# Patient Record
(demographics unavailable — no encounter records)

---

## 2017-05-14 NOTE — OBHP
===========================

Datetime: 2017 03:49

===========================

   

IP Adm Impression:  Term, intrauterine pregnancy

IP Admit Plan:  Admit to unit; Initiate labor protocol

Admit Comment, IP Provider:  17 y/o  @ 39.1 wks GA HIGINIO 17 by US c/o of gross LOF , large 
gush at 2:40am, with continued spurts. Pt also reports contraction pain every 5-10 minutes increasing
 intensity and frequency 7/10. Pt denies any VB, and reports nromal FM> Pt was recently evalauted and
 examined and ws 4 cm. Pt reports pnc has been uncomplicated.

      

   Ante: Morbid obesity BMI: 43.17, Fibroid, LV echogenic foci, FIbroid 14.8x10.5x10.7cm by 31.4 wk U
S done 3/22/17 , s/p MRI poor reading 17, 

   OB:  2014 FT 6lbs 12 ounces uncomplicated

   GYN: denies hx of ovarian cyst, STI, + FIbroids 

   PMH: Denies

   PSH: denies

   FHX: non contributory

   MEDS: PNV, Iron

   NKDA

      

   A/P 17 y/o  @ 39.1 wks GA, SROM in active labor, with fibroid uterus, teen pregnancy, gbs n
egative

   1. Admit to L+D

   2. NPO, IVF

   3. LR @ !25cc/hour

   4. Pain Management prn/ Anesthesia

   5. Cont toco and efm

   6. Pre OP labs

   7. Incompete prenatal records (will try calling in AM for records)

      

Pelvic Type - PN:  Adequate

Extremities - PN:  Normal

Abdomen - PN:  Normal

Back - PN:  Normal

Breast - PN:  Normal

Lungs - PN:  Normal

Heart - PN:  Normal

Thyroid - PN:  Normal

Neurologic - PN:  Normal

HEENT - PN:  Normal

General - PN:  Normal

Fetal Weight - Estimated:  3500

Fetal Presentation-Admit:  Vertex

FHR - Baseline A Provider:  150

Amniotic Fluid Color, Provider:  Clear

Membranes, Provider:  Ruptured

Gestation - Est Wks by US:  39.1

Pool Provider:  Positive

IP Prenatal Hx Assessment:  The Prenatal History has been Updated

EGA AdmitDate IP:  39.1

Vital Signs Provider:  Reviewed

IP Chief Complaint:  Uterine contractions; Suspected ruptured membranes

NICHD Variability Prov Fetus A:  Moderate 6-25bpm

NICHD Accel Fetus A IP Provider:  15X15

Dilatation, Provider:  5

Effacement, Provider:  60

Station, Provider:  -3

Genitourinary Exam:  Normal

DTRs - PN:  Normal

   

===========================

Datetime: 2017 01:40

===========================

   

IP Chief Complaint Other:  MRI results 

Contraction Comments Provider:  no

Comments, ACOG Physical Exam:  Bedside US: vertex, adequate pocket of  amniotic fluids.

FHR Category Provider Fetus A:  Category I

NICHD Decel Fetus A IP Provider:  None

## 2017-05-14 NOTE — OBDS
===================================

MATERNAL INFORMATION

===================================

   

Provider Comments:  Pt was fully dilated, atruamtic, spontaneous delivery of head. Atraumatic, sponta
neous delivery fo anterior followed by posterior shoulder followed by delivery of the body. Both oral
 and nasal passages of the baby were bulb suctioned. Umbilical cord was clamped and cut. Baby hadned 
to mother on abdomen with RN assistance.  Cord blood collected and sent x 2.  Spontaneous delivery of
 intact placenta with membranes. FUndus Firm.  Seond degree perineal lacetion repaired with 2-0 chorm
ic.  Good hemostaiiss, no complications.

      

   Live female infant delivered

   apgars 9, 9 

   weight of 6lb 4 ounces

   ebl 350ml

   no complications

   

===================================

LABOR SUMMARY

===================================

   

EDC:  05/20/2017 00:00

   

===================================

LABOR INFORMATION

===================================

   

Group B Beta Strep:  Negative

   

===================================

VAGINAL DELIVERY

===================================

   

Laceration Repair Note:  second degree perineal laceration repaired with local anesthesihci lidocaine
 and 2-0 and 3-0 chromic

   

===================================

PRESENTATION/POSITION BABY A

===================================

   

Presentation:  Cephalic

## 2017-05-14 NOTE — OBPN
===========================

Datetime: 2017 05:10

===========================

   

IP Progress Impression:  Normal progression of labor

IP Informed Consent Obtain:  Vaginal Delivery

IP Procedures:  Fetal Scalp Electrode

IP Progress Plan:  Continue present management

Membranes, Provider:  Ruptured

FHR - Baseline A Provider:  150

Gestation - Est Wks by US:  39.1

IP Progress Note Comment:  18 y/u  @ 39.1 wks GA SROM in labor with large fibroid uterus, havi
ng difficulty to maintain continous FHT due to body habitus, pt morbidly obese

   Pt and  consented to fetal scalp electrode to help allow for continous monitoring.

      

   VSS

   VE: 6-7cm, Fetal scalp electrode placed without difficulty

      

   A/P  @ 39.1 wks GA SROM in active labor, Fibroid uterus, teen pregnancy

   - FSE monitoring

   -Anesthesia consult prn

Vital Signs Provider:  Reviewed

NICHD Accel Fetus A IP Provider:  [id number]

FHR Category Provider Fetus A:  Category I

NICHD Variability Prov Fetus A:  Moderate 6-25bpm

Dilatation, Provider:  6-7

Effacement, Provider:  60

Station, Provider:  -3

NICHD Decel Fetus A IP Provider:  Variable

   

===========================

Datetime: 2017 03:49

===========================

   

Pool Provider:  Positive

Amniotic Fluid Color, Provider:  Clear

Fetal Weight - Estimated:  3500

Fetal Presentation-Admit:  Vertex

   

===========================

Datetime: 2017 01:40

===========================

   

Contraction Comments Provider:  no

## 2017-05-14 NOTE — OBADHP
===========================

Datetime: 2017 03:49

===========================

   

Admit Comment, IP Provider:  19 y/o  @ 39.1 wks GA HIGINIO 17 by US c/o of gross LOF , large 
gush at 2:40am, with continued spurts. Pt also reports contraction pain every 5-10 minutes increasing
 intensity and frequency 7/10. Pt denies any VB, and reports nromal FM> Pt was recently evalauted and
 examined and ws 4 cm. Pt reports pnc has been uncomplicated.

      

   Ante: Morbid obesity BMI: 43.17, Fibroid, LV echogenic foci, FIbroid 14.8x10.5x10.7cm by 31.4 wk U
S done 3/22/17 , s/p MRI poor reading 17, 

   OB:  2014 FT 6lbs 12 ounces uncomplicated

   GYN: denies hx of ovarian cyst, STI, + FIbroids 

   PMH: Denies

   PSH: denies

   FHX: non contributory

   MEDS: PNV, Iron

   NKDA

      

   A/P 19 y/o  @ 39.1 wks GA, SROM in active labor, with fibroid uterus, teen pregnancy, gbs n
egative

   1. Admit to L+D

   2. NPO, IVF

   3. LR @ !25cc/hour

   4. Pain Management prn/ Anesthesia

   5. Cont toco and efm

   6. Pre OP labs

   7. Incompete prenatal records (will try calling in AM for records)

      

Pelvic Type - PN:  Adequate

Extremities - PN:  Normal

Abdomen - PN:  Normal

Back - PN:  Normal

Breast - PN:  Normal

Lungs - PN:  Normal

Heart - PN:  Normal

Thyroid - PN:  Normal

Neurologic - PN:  Normal

HEENT - PN:  Normal

General - PN:  Normal

Fetal Weight - Estimated:  3500

Fetal Presentation-Admit:  Vertex

FHR - Baseline A Provider:  150

Amniotic Fluid Color, Provider:  Clear

Membranes, Provider:  Ruptured

Gestation - Est Wks by US:  39.1

Pool Provider:  Positive

IP Prenatal Hx Assessment:  The Prenatal History has been Updated

Vital Signs Provider:  Reviewed

IP Chief Complaint:  Uterine contractions; Suspected ruptured membranes

NICHD Variability Prov Fetus A:  Moderate 6-25bpm

NICHD Accel Fetus A IP Provider:  [id number]

Dilatation, Provider:  5

Effacement, Provider:  60

Station, Provider:  -3

Genitourinary Exam:  Normal

DTRs - PN:  Normal

EGA AdmitDate IP:  39.1

IP Adm Impression:  Term, intrauterine pregnancy

IP Admit Plan:  Admit to unit; Initiate labor protocol

   

===========================

Datetime: 2017 01:40

===========================

   

IP Chief Complaint Other:  MRI results 

Contraction Comments Provider:  no

Comments, ACOG Physical Exam:  Bedside US: vertex, adequate pocket of  amniotic fluids.

FHR Category Provider Fetus A:  Category I

NICHD Decel Fetus A IP Provider:  None

## 2017-05-15 NOTE — CP.PCM.PN
Subjective





- Date & Time of Evaluation


Date of Evaluation: 05/15/17


Time of Evaluation: 09:04





- Subjective


Subjective: 


Vague headache  noted on getting up this morning but improved upon laying and 

sitting on bed. As per Dr. Cooley, He wasn't able to enter the epidural space 

only attempts done. Unlikely to have post dural puncture headache. Currently 

patient sitting in bed not complaining of headache.








Objective





- Vital Signs/Intake and Output


Vital Signs (last 24 hours): 


 











Temp Pulse Resp BP Pulse Ox


 


 97.5 F L  94      141/80 H   


 


 05/14/17 03:30  05/14/17 03:30     05/14/17 03:30   











- Medications


Medications: 


 Current Medications





Benzocaine/Menthol (Dermoplast)  1 sprays TOP PRN PRN


   PRN Reason: Perineal Discomfort


Docusate Sodium (Colace)  100 mg PO BID [facility]


   Last Admin: 05/14/17 16:36 Dose:  100 mg


Ferrous Sulfate (Feosol)  325 mg PO DAILY [facility]


   Last Admin: 05/14/17 15:23 Dose:  325 mg


Ibuprofen (Motrin Tab)  600 mg PO Q6 PRN


   PRN Reason: Pain, Mild (1-3)


   Last Admin: 05/15/17 06:52 Dose:  600 mg


Multivitamins/Minerals (Therapeutic-M Tab)  1 tab PO DAILY [facility]


   Last Admin: 05/14/17 15:24 Dose:  1 tab


Oxycodone/Acetaminophen (Percocet 5/325 Mg Tab)  1 tab PO Q4 PRN


   PRN Reason: Pain, moderate (4-7)


   Stop: 05/17/17 07:00


Oxycodone/Acetaminophen (Percocet 5/325 Mg Tab)  2 tab PO Q4 PRN


   PRN Reason: Pain, severe (8-10)


   Stop: 05/17/17 07:00


Sennosides (Senokot Tab)  17.2 mg PO HS [facility]


   Last Admin: 05/14/17 21:01 Dose:  17.2 mg











- Labs


Labs: 


 





 05/15/17 05:36 





 05/14/17 04:37 











Assessment and Plan





- Assessment and Plan (Free Text)


Assessment: 


Orthostatic headache  probably due to mild dehydration vs hypoglycemia. 





Plan: 


Analgesic for headache. Does not need narcotic analgesic.


Continue hydration.


If conditon worsens, may need further work up.

## 2017-05-16 NOTE — OBDCSUM
===========================

Datetime: 2017 07:03

===========================

   

Discharged to, Provider:  Home

Follow up at, Provider:  [facility]

Disch Instr Activity:  Normal activity; May Shower

Disch Instr Diet:  Regular

Discharge Instructions, Provider:  Routine instructions given

Discharge Diagnosis, Provider:  Term Pregnancy Delivered

Follow up in weeks, Provider:  6 weeks

Disch Activity Restrictions:  No exercising; No lifting; No sexual activity; Nothing in vagina - Inte
rcourse, tampons, douche

Discharge Comment, Provider:  19 y/o   s/p C   @ 39.1  weeks GA

   Delivered baby girl  on 17 @ 6:25 am ,weight 2855 g , APGAR: 9-9 

   Patient doing well, stable for discharge. Prescription given for pain 

      

   -Encourage breastfeeding

   -Tylenol 600 mg PO 1 tab PO Q6h for pain PRN

   -Discharge today 

      

   Ambulate w/ caution, nothing in vagina, no heavy lifting, avoid stairs, if excessive bleeding or f
ever without relief from Tylenol go to ED

   - Advised  for F/U  [facility] in 6 week  and 2-3 days for infant with peditrician.

      

   Paige Rodas PGY1

      

      

   OB Hospitalist on-call....Pt seen and examined by me on rounds this morning.  Agree with PGY1 note
.

   ALPHONSO

   

===========================

Datetime: 2017 07:15

===========================

   

Discharge Instructions, Provider:  Routine instructions given

## 2017-05-16 NOTE — OBPPN
===========================

Datetime: 2017 06:50

===========================

   

PP Pain Prov:  Within normal limits

PP Nausea Prov:  Denies

PP Flatus Prov:  Yes

PP BM Prov:  Yes

PP Breasts Prov:  Normal

PP Heart Prov:  Normal

PP Lungs Prov:  Normal

PP Abdomen/Uterus Prov:  Normal

PP Lochia Prov:  Normal

PP Vulva/Perineum Prov:  Normal

PP CVA Tenderness Prov:  Normal

PP Extremities Prov:  Normal

PP C/S Incision Prov:  Not Applicable

PP Breastfeeding Progress Prov:  Normal

PP Comments Phys Exam Prov:  Uterus: firm below umbilicus

PP Impression Prov:  Normal postpartum progression

PP Plan Prov:  Continue present management

PP Progress Note Prov:  19 y/o  seen and examined at bedside.  Patient   had uneventful overnigh
t.  Patient reports mild pelvic pain controlled w/ pain meds and also headache intermittent 5/10 in i
ntensity when sit up, alleviated with pain meds.  OOB/Ambulating w/o dizziness.  Breast/bottle feedin
g w/o difficulty.  Tolerating PO diet well.  Lochia is less than menses in volume.  Voiding freely w/
 no blood noted.  Reports flatus and had 1  bowel movement yesterday.  Denies fevers, chills, n/v/d, 
CP/SOB, lightheadedness and calf pain. 

      

      

   Assessment: 19 y/o   s/p  on 2017 @ 6:25 am tolerating pain w/ medication, toleratin
g oral intake, adequate urine output, doing well on PPD2.

      

   Plan: 

   Ibuprofen 600 mg 1 tab Q6h PO prn for mild pain.  

   Encourage breast feeding and ambulation. 

   -Discharge patient 

      

   Paige Rodas PGY-1

      

      

   OB Hospitalist on-call....Pt seen and examined by me on rounds this morning.  Agree with PGY1 note
.

   ELVIANDO

Vital Signs Provider PP:  Reviewed; Within Normal Limits

   

===========================

Datetime: 05/15/2017 07:55

===========================

   

PP Impression Other Prov:  headache 8/10 reassess 5/10

PP Plan Other Prov:  reassess

IP PP Procedures:  None

Vital Signs Provider Details PP:  BP : 109/66

## 2017-09-20 NOTE — US
Indication: Rule out ectopic 



Comparison: Ob ultrasound performed 7/30/14 ; pelvic MRI performed 

5/6/17. 



Technique: Transvaginal pelvic ultrasound 



Findings: 



The uterus measures approximately 6.7 x 4.2 x 4.5 cm.  Anteverted.  

Cervix length measures approximately 3.1 cm. 



Endometrial stripe measures approximately 1.1 cm. No evidence of 

intrauterine gestational sac. 



Heterogeneous vascular 15.6 x 10.6 x 16 cm lesion/focus within right 

lower quadrant which appears consistent with soft tissue  ; unclear 

if this is contiguous with the uterus or not. 



Large amount of pelvic free fluid. 



The right ovary measures 2.7 x 2.0 x 2.2 cm. The left ovary is not 

visualized. Blood flow is demonstrated to the right ovary. 



Incidental note is made of right-sided pleural effusion. 



Impression: 



Heterogeneous vascular 15.6 x 10.6 x 16 cm lesion/focus within right 

lower quadrant which appears consistent with soft tissue ; is unclear 

if this is contiguous with the uterus. This finding is indeterminate 

not identified on prior studies.  Neoplasm is not excluded. Correlate 

clinically. Follow-up as indicated. 



No evidence of intrauterine gestational sac. If indeed the patient is 

pregnant based on serum beta HCG values, the sonographic findings 

represent either: Very early IUP; embryonic demise; ectopic 

gestation.  Follow-up with serial quantitative serum beta HCG 

measurements and post OBGYN follow-up as clinically indicated, since 

ectopic gestation cannot be excluded based only on sonographic 

findings. 



Large pelvic free fluid. 



Incidental note is made of right-sided pleural effusion.

## 2017-09-20 NOTE — CP.PCM.CON
<Kadeem Ocampo - Last Filed: 17 19:31>





History of Present Illness





- History of Present Illness


History of Present Illness: 





Samantha Billy is a pleasant 17 yo F  who presented to the ED with L 

lower quadrant abdominal pain. She shares that while laying down this afternoon

, she suddenly felt LLQ abdo pain, constant in duration, exacerbated with 

movement, alleviated with sitting up. Denies: NVD





LMP: 2017 Regular 28-30 day cycles duration of 5 days


obhx:   full term;   full term (without prenatal care) Seen by 

Dr. Juan Carlos bass Hx: denies STIs; 


Mhx: 2017: US: L adnexal myoma measuring 14.8x10.5x10.7


Surg hx: none


Soc: Denies: smoking, alcohol, illicit drugs; currently sexually active; last 

sexual encounter 2 days ago. 


Meds: none





PE:


General: in acute distress  


HEENT: normocephalic, PERRLA; AAOx3  


Heart: no murmurs, regular rate and rhythm, S1, S2 normal.  


Lungs: clear to auscultation bilaterally, no wheezing  


Abdomen: LLQ tenderness to palpation  


CVA: negative  


Lower extremities: negative for pitting edema 


Pelvic exam: negative for vaginal bleed; negative for CMT.





Last bowel movement this AM; no flatus; Last meal was yesterday evening





A:


UPT: + (10)


L lower quadrant tenderness to palpation


TVUS: heterogeneous vascular 15.6 x10.6x 10 cm lesion at RLQ; pleural effusion; 

no evidence of intrauterine gestational sac





P:


Admit to floor; 





1) L lower abdominal pain


-AM abdo/pelvis US


-pain management





2) Fluid in pelvis


-will consider ddx for etiology





3) Myoma





case d/w Dr. Hurst








Past Patient History





- Past Social History


Smoking Status: Never Smoked





- PSYCHIATRIC


Hx Substance Use: No





- SURGICAL HISTORY


Hx Surgeries: No





- ANESTHESIA


Hx Anesthesia: No





Meds


Allergies/Adverse Reactions: 


 Allergies











Allergy/AdvReac Type Severity Reaction Status Date / Time


 


No Known Allergies Allergy   Verified 17 13:58














Results





- Vital Signs


Recent Vital Signs: 


 Last Vital Signs











Temp  98.6 F   17 17:45


 


Pulse  110 H  17 17:45


 


Resp  28 H  17 17:45


 


BP  121/54 L  17 17:45


 


Pulse Ox  98   17 18:52














- Labs


Result Diagrams: 


 17 14:51





 17 14:51


Labs: 


 Laboratory Results - last 24 hr











  17





  14:51 14:51 15:00


 


WBC   9.4  D 


 


RBC   5.26 H 


 


Hgb   11.5 L D 


 


Hct   35.9 


 


MCV   68.3 L D 


 


MCH   21.8 L 


 


MCHC   31.9 L 


 


RDW   17.3 H 


 


Plt Count   532 H D 


 


MPV   8.2 


 


Neut % (Auto)   76.9 H 


 


Lymph % (Auto)   14.5 L 


 


Mono % (Auto)   5.2 


 


Eos % (Auto)   2.6 


 


Baso % (Auto)   0.8 


 


Neut #   7.3 H 


 


Lymph #   1.4 


 


Mono #   0.5 


 


Eos #   0.2 


 


Baso #   0.1 


 


Sodium  142  


 


Potassium  4.2  


 


Chloride  105  


 


Carbon Dioxide  24  


 


Anion Gap  17  


 


BUN  18 H  


 


Creatinine  0.7  


 


Est GFR ( Amer)  > 60  


 


Est GFR (Non-Af Amer)  > 60  


 


Random Glucose  103  


 


Calcium  9.7  


 


Total Bilirubin  0.5  


 


AST  55 H  


 


ALT  63 H D  


 


Alkaline Phosphatase  192 H  


 


Total Protein  8.8 H  


 


Albumin  4.6  


 


Globulin  4.2 H  


 


Albumin/Globulin Ratio  1.1  


 


Beta HCG, Quant   


 


Blood Type    O POSITIVE


 


Antibody Screen    Negative


 


BBK History Checked    Patient has bt














  17





  15:00


 


WBC 


 


RBC 


 


Hgb 


 


Hct 


 


MCV 


 


MCH 


 


MCHC 


 


RDW 


 


Plt Count 


 


MPV 


 


Neut % (Auto) 


 


Lymph % (Auto) 


 


Mono % (Auto) 


 


Eos % (Auto) 


 


Baso % (Auto) 


 


Neut # 


 


Lymph # 


 


Mono # 


 


Eos # 


 


Baso # 


 


Sodium 


 


Potassium 


 


Chloride 


 


Carbon Dioxide 


 


Anion Gap 


 


BUN 


 


Creatinine 


 


Est GFR ( Amer) 


 


Est GFR (Non-Af Amer) 


 


Random Glucose 


 


Calcium 


 


Total Bilirubin 


 


AST 


 


ALT 


 


Alkaline Phosphatase 


 


Total Protein 


 


Albumin 


 


Globulin 


 


Albumin/Globulin Ratio 


 


Beta HCG, Quant  10.75


 


Blood Type 


 


Antibody Screen 


 


BBK History Checked 














<Demario Hurst S - Last Filed: 17 22:05>





History of Present Illness





- History of Present Illness


History of Present Illness: 


OBH Addendum:





Patient seen and examined with Dr. Ocampo. Agree with above assessment and plan 

with the following clarifications and additions.


atient is an 18-year-old female  3 para 2002 with an LMP of 2017.  

she is status post a vaginal delivery in   May 2017. She states that she has 

been having monthly normal menses since her positive her menses after her 

postpartum period.  





Patient presented to ED with complaints of acute onset of left lower quadrant 

pain at around 12:30 PM. She states she has had nothing to eat or drink and did 

have 1 episode of bilious emesis. Patient states that a left sided fibroid was 

detected on her  ultrasound during her prior pregnancy.  she did not present 

for management of her mass following her vaginal delivery.  





Review of Systems





- Constitutional


Constitutional: absent: Chills





- Cardiovascular


Cardiovascular: absent: Chest Pain, Chest Pain at Rest





- Respiratory


Respiratory: absent: Cough, Dyspnea on Exertion





- Gastrointestinal


Gastrointestinal: Abdominal Pain.  absent: Bloating, Change in Bowel Habits, 

Coffee Ground Emesis, Constipation





- Genitourinary


Genitourinary: absent: Difficulty Urinating





- Reproductive: Female


Reproductive:Female: Normal Menses.  absent: Cycle <21 Days, Cycle >35 Days, 

Menses >/= 8 Days, Menses Variable





- Menstruation


Menstruation: absent: Abnormal Vaginal Bleeding





Past Patient History





- Past Medical History & Family History


Past Medical History?: No





- Past Social History


Alcohol: None





- CARDIAC


Hx Cardiac Disorders: No





- PULMONARY


Hx Respiratory Disorders: No





- NEUROLOGICAL


Hx Neurological Disorder: No





- HEENT


Hx HEENT Problems: No





- ENDOCRINE/METABOLIC


Hx Endocrine Disorders: No





- GASTROINTESTINAL


Hx Gastrointestinal Disorders: No





Meds





- Medications


Medications: 


 Current Medications





Acetaminophen (Tylenol 325mg Tab)  650 mg PO Q6 PRN


   PRN Reason: Pain, Mild (1-3)


Ferrous Sulfate (Feosol)  325 mg PO BID [facility]


Prenatal Multivit/Folic Acid/Iron (Prenatal)  1 tab PO DAILY [facility]











Physical Exam





- Head Exam


Head Exam: ATRAUMATIC, NORMOCEPHALIC





- Respiratory Exam


Respiratory Exam: NORMAL BREATHING PATTERN





- Cardiovascular Exam


Cardiovascular Exam: REGULAR RHYTHM





- GI/Abdominal Exam


GI & Abdominal Exam: Normal Bowel Sounds, Tenderness (LLQtenderness to palpation

).  absent: Distended





- Rectal Exam


Rectal Exam: NORMAL INSPECTION





-  Exam


External exam: NORMAL EXTERNAL EXAM


Speculum exam: NORMAL SPECULUM EXAM


Bimanual exam: absent: Cervical Motion Tendernes, Uterine Tenderness





- Extremities Exam


Extremities exam: Positive for: normal inspection





- Neurological Exam


Neurological exam: Oriented x3





Results





- Vital Signs


Recent Vital Signs: 


 Last Vital Signs











Temp  98.6 F   17 17:45


 


Pulse  110 H  17 17:45


 


Resp  28 H  17 17:45


 


BP  121/54 L  17 17:45


 


Pulse Ox  98   17 18:52














- Labs


Result Diagrams: 


 17 14:51





 17 14:51


Labs: 


 Laboratory Results - last 24 hr











  17





  14:51 14:51 15:00


 


WBC   9.4  D 


 


RBC   5.26 H 


 


Hgb   11.5 L D 


 


Hct   35.9 


 


MCV   68.3 L D 


 


MCH   21.8 L 


 


MCHC   31.9 L 


 


RDW   17.3 H 


 


Plt Count   532 H D 


 


MPV   8.2 


 


Neut % (Auto)   76.9 H 


 


Lymph % (Auto)   14.5 L 


 


Mono % (Auto)   5.2 


 


Eos % (Auto)   2.6 


 


Baso % (Auto)   0.8 


 


Neut #   7.3 H 


 


Lymph #   1.4 


 


Mono #   0.5 


 


Eos #   0.2 


 


Baso #   0.1 


 


Sodium  142  


 


Potassium  4.2  


 


Chloride  105  


 


Carbon Dioxide  24  


 


Anion Gap  17  


 


BUN  18 H  


 


Creatinine  0.7  


 


Est GFR ( Amer)  > 60  


 


Est GFR (Non-Af Amer)  > 60  


 


Random Glucose  103  


 


Calcium  9.7  


 


Total Bilirubin  0.5  


 


AST  55 H  


 


ALT  63 H D  


 


Alkaline Phosphatase  192 H  


 


Total Protein  8.8 H  


 


Albumin  4.6  


 


Globulin  4.2 H  


 


Albumin/Globulin Ratio  1.1  


 


Beta HCG, Quant   


 


Blood Type    O POSITIVE


 


Antibody Screen    Negative


 


BBK History Checked    Patient has bt














  17





  15:00


 


WBC 


 


RBC 


 


Hgb 


 


Hct 


 


MCV 


 


MCH 


 


MCHC 


 


RDW 


 


Plt Count 


 


MPV 


 


Neut % (Auto) 


 


Lymph % (Auto) 


 


Mono % (Auto) 


 


Eos % (Auto) 


 


Baso % (Auto) 


 


Neut # 


 


Lymph # 


 


Mono # 


 


Eos # 


 


Baso # 


 


Sodium 


 


Potassium 


 


Chloride 


 


Carbon Dioxide 


 


Anion Gap 


 


BUN 


 


Creatinine 


 


Est GFR ( Amer) 


 


Est GFR (Non-Af Amer) 


 


Random Glucose 


 


Calcium 


 


Total Bilirubin 


 


AST 


 


ALT 


 


Alkaline Phosphatase 


 


Total Protein 


 


Albumin 


 


Globulin 


 


Albumin/Globulin Ratio 


 


Beta HCG, Quant  10.75


 


Blood Type 


 


Antibody Screen 


 


BBK History Checked 














Assessment & Plan





- Assessment and Plan (Free Text)


Assessment: 





Impression:


Left lower quadrant pain


Right sided pelvic mass by US today


Left sided adnexal mass by US on 3/22/2017 during her preg


Large amount of hypoechoic free fluid in pelvis


Positive pregnancy test with Q hCG= 11


Right Sided Pleural effusion





Plan:


US findings d/w Dr. Joshi, radiologist. She recommends repeat pelvic us with 

abdominal us tomorrow.


Repeat cbc tomorrow am.


Needs f/u QHCG.

## 2017-09-20 NOTE — CP.PCM.HP
History of Present Illness





- History of Present Illness


History of Present Illness: 





18-year-old female  with an LMP of 2017; status post a vaginal 

delivery in May 2017 presented to ED with complaints of acute onset of left 

lower quadrant pain at around 12:30 PM. She states she has had nothing to eat 

or drink and did have 1 episode of bilious emesis. Patient states that a left 

sided fibroid was detected on her  ultrasound during her prior 2nd pregnancy.  

she did not present for management of her mass following her vaginal delivery. 

Denies any associated vaginal bleeding, fever, chills, chest pain or shortness 

of breath.





PMD: None





obhx:   full term; 2017  full term (without prenatal care) Seen by 

Dr. Rangel


gyn Hx: denies STIs; 


PMhx: 2017: US: L adnexal myoma measuring 14.8x10.5x10.7


Surg hx: none


Soc: Denies: smoking, alcohol, illicit drugs; currently sexually active; last 

sexual encounter 2 days a





 








Present on Admission





- Present on Admission


Any Indicators Present on Admission: No





Review of Systems





- Review of Systems


All systems: reviewed and no additional remarkable complaints except


Review of Systems: 





per HPI





Past Patient History





- Past Social History


Smoking Status: Never Smoked





- PSYCHIATRIC


Hx Substance Use: No





- SURGICAL HISTORY


Hx Surgeries: No





- ANESTHESIA


Hx Anesthesia: No





Meds


Allergies/Adverse Reactions: 


 Allergies











Allergy/AdvReac Type Severity Reaction Status Date / Time


 


No Known Allergies Allergy   Verified 17 13:58














Physical Exam





- Constitutional


Appears: Non-toxic, No Acute Distress





- Head Exam


Head Exam: NORMOCEPHALIC





- Eye Exam


Eye Exam: Normal appearance, PERRL


Pupil Exam: NORMAL ACCOMODATION





- ENT Exam


ENT Exam: Mucous Membranes Moist





- Respiratory Exam


Respiratory Exam: Clear to Auscultation Bilateral, NORMAL BREATHING PATTERN.  

absent: Rhonchi, Wheezes





- Cardiovascular Exam


Cardiovascular Exam: REGULAR RHYTHM, +S1, +S2





- GI/Abdominal Exam


GI & Abdominal Exam: Normal Bowel Sounds, Soft, Tenderness


Additional comments: 





LLQ tenderness , +rebound tenderness





- Extremities Exam


Extremities exam: Negative for: calf tenderness, pedal edema, tenderness





- Back Exam


Back exam: absent: CVA tenderness (L), CVA tenderness (R)





- Neurological Exam


Neurological exam: Alert, CN II-XII Intact, Oriented x3, Reflexes Normal





Results





- Vital Signs


Recent Vital Signs: 





 Last Vital Signs











Temp  98.6 F   17 17:45


 


Pulse  110 H  17 17:45


 


Resp  28 H  17 17:45


 


BP  121/54 L  17 17:45


 


Pulse Ox  98   17 18:52














- Labs


Result Diagrams: 


 17 14:51





 17 14:51


Labs: 





 Laboratory Results - last 24 hr











  17





  14:51 14:51 15:00


 


WBC   9.4  D 


 


RBC   5.26 H 


 


Hgb   11.5 L D 


 


Hct   35.9 


 


MCV   68.3 L D 


 


MCH   21.8 L 


 


MCHC   31.9 L 


 


RDW   17.3 H 


 


Plt Count   532 H D 


 


MPV   8.2 


 


Neut % (Auto)   76.9 H 


 


Lymph % (Auto)   14.5 L 


 


Mono % (Auto)   5.2 


 


Eos % (Auto)   2.6 


 


Baso % (Auto)   0.8 


 


Neut #   7.3 H 


 


Lymph #   1.4 


 


Mono #   0.5 


 


Eos #   0.2 


 


Baso #   0.1 


 


Sodium  142  


 


Potassium  4.2  


 


Chloride  105  


 


Carbon Dioxide  24  


 


Anion Gap  17  


 


BUN  18 H  


 


Creatinine  0.7  


 


Est GFR ( Amer)  > 60  


 


Est GFR (Non-Af Amer)  > 60  


 


Random Glucose  103  


 


Calcium  9.7  


 


Total Bilirubin  0.5  


 


AST  55 H  


 


ALT  63 H D  


 


Alkaline Phosphatase  192 H  


 


Total Protein  8.8 H  


 


Albumin  4.6  


 


Globulin  4.2 H  


 


Albumin/Globulin Ratio  1.1  


 


Beta HCG, Quant   


 


Blood Type    O POSITIVE


 


Antibody Screen    Negative


 


BBK History Checked    Patient has bt














  17





  15:00


 


WBC 


 


RBC 


 


Hgb 


 


Hct 


 


MCV 


 


MCH 


 


MCHC 


 


RDW 


 


Plt Count 


 


MPV 


 


Neut % (Auto) 


 


Lymph % (Auto) 


 


Mono % (Auto) 


 


Eos % (Auto) 


 


Baso % (Auto) 


 


Neut # 


 


Lymph # 


 


Mono # 


 


Eos # 


 


Baso # 


 


Sodium 


 


Potassium 


 


Chloride 


 


Carbon Dioxide 


 


Anion Gap 


 


BUN 


 


Creatinine 


 


Est GFR ( Amer) 


 


Est GFR (Non-Af Amer) 


 


Random Glucose 


 


Calcium 


 


Total Bilirubin 


 


AST 


 


ALT 


 


Alkaline Phosphatase 


 


Total Protein 


 


Albumin 


 


Globulin 


 


Albumin/Globulin Ratio 


 


Beta HCG, Quant  10.75


 


Blood Type 


 


Antibody Screen 


 


BBK History Checked 














Assessment & Plan





- Assessment and Plan (Free Text)


Assessment: 





17 y/o  with no significant medical history being admitted for LLQ pain 

with ultrasound finding of effusion cannot rule out ectopic pregnancy or normal 

IUP 


Plan: 





1. Left Lower quadrant abdominal pain


TVUS: heterogeneous vascular 15.6 x10.6x 10 cm lesion at RLQ; pleural effusion; 

no evidence of intrauterine gestational sac


cannot rule out ectopic pregnancy or IUP


pain management as ordered


Beta HCG ordered for 12PM 17


Repeat Ultrasound in the am


monitor





2. Myoma 


will need outpatient Gyn referral for further management





3. Diet- Regular





4. DVT prophylaxis- SCds

## 2017-09-20 NOTE — ED PDOC
HPI: Abdomen


Time Seen by Provider: 17 14:17


Chief Complaint (Nursing): Abdominal Pain


History Per: Patient


Onset/Duration Of Symptoms: Hrs (3)


Current Symptoms Are (Timing): Still Present


Severity: Mild


Pain Scale Rating Of: 2


Location Of Pain/Discomfort: LLQ


Quality Of Discomfort: Sharp


Associated Symptoms: denies: Fever, Nausea, Vomiting, Diarrhea, Urinary Symptoms


Exacerbating Factors: None


Alleviating Factors: None


Additional Complaint(s): 





Sharp LLQ abd pain x 3 hrs PTA ED. Denies NVD. No urinary sxs. No vaginal 

bleeding or discharge. LMP 1 week ago. H/o fibroids


Abnormal Vaginal Bleeding: Yes





Past Medical History


Vital Signs: 


 Last Vital Signs











Temp  98.6 F   17 17:45


 


Pulse  110 H  17 17:45


 


Resp  28 H  17 17:45


 


BP  121/54 L  17 17:45


 


Pulse Ox  96   17 17:45














- Medical History


Other PMH: Fibroids





- Family History


Family History: States: Unknown Family Hx





- Home Medications


Home Medications: 


 Ambulatory Orders











 Medication  Instructions  Recorded


 


Prenatal Vit Calc,Iron,Folic 1 each PO DAILY 17





[Prenatal Vitamins]  


 


Ferrous Sulfate [Feosol] 325 mg PO BID #60 tab 17


 


Ibuprofen [Motrin Tab] 600 mg PO Q6 PRN #30 tab 17














- Allergies


Allergies/Adverse Reactions: 


 Allergies











Allergy/AdvReac Type Severity Reaction Status Date / Time


 


No Known Allergies Allergy   Verified 17 13:58














Review of Systems


ROS Statement: Except As Marked, All Systems Reviewed And Found Negative


Gastrointestinal: Positive for: Abdominal Pain.  Negative for: Nausea, Vomiting

, Diarrhea


Genitourinary Female: Negative for: Dysuria, Frequency, Vaginal Discharge, 

Vaginal Bleeding


Musculoskeletal: Negative for: Back Pain





Physical Exam





- Physical Exam


Appears: Positive for: Non-toxic, No Acute Distress


Skin: Positive for: Normal Color, Warm, DRY


Gastrointestinal/Abdominal: Positive for: Bowel Sounds, Soft, Tenderness (LLQ).

  Negative for: Mass


Back: Negative for: L CVA Tenderness, R CVA Tenderness


Extremity: Positive for: Normal ROM


Neurologic/Psych: Positive for: Oriented





- Laboratory Results


Result Diagrams: 


 17 14:51





 17 14:51





- ECG


O2 Sat by Pulse Oximetry: 98





Disposition





- Clinical Impression


Clinical Impression: 


 Threatened , Pleural effusion








- Patient ED Disposition


Is Patient to be Admitted: Yes





- Disposition


Disposition Time: 17:27


Condition: FAIR


Additional Instructions: 


Return for repeat Beta HCG 48 hrs.


Instructions:  Threatened Miscarriage (ED)


Forms:  [facility] (English)





- Pt Status Changed To:


Hospital Disposition Of: Observation





- POA


Present On Arrival: None

## 2017-09-21 NOTE — CP.PCM.PN
Subjective





- Date & Time of Evaluation


Date of Evaluation: 17


Time of Evaluation: 07:40





- Subjective


Subjective: 





17 yo female seen at the bedside and examined. Pt reports continuous pain on 

her lower abdomen (L>R),worse with body movement. Pt reports saw spotting this 

morning when wiping with tissue. Denies clots of blood or heavy bleeding. 

Denies nausea, vomiting, fever, chills. Denies chest pain, dizziness, headache 

or leg pain. Pt is scheduled to go OR today for laparotomy, removal of ectopic 

pregancy if present, and removal of pelvic mass.





Objective





- Vital Signs/Intake and Output


Vital Signs (last 24 hours): 


 











Temp Pulse Resp BP Pulse Ox


 


 98.2 F   121 H  20   120/80   95 


 


 17 08:00  17 08:00  17 08:00  17 08:00  17 08:00











- Medications


Medications: 


 Current Medications





Acetaminophen (Tylenol 325mg Tab)  650 mg PO Q6 PRN


   PRN Reason: Pain, Mild (1-3)


   Last Admin: 17 06:53 Dose:  650 mg


Ferrous Sulfate (Feosol)  325 mg PO BID Counts include 234 beds at the [facility]


   Last Admin: 17 08:55 Dose:  Not Given


Prenatal Multivit/Folic Acid/Iron (Prenatal)  1 tab PO DAILY Counts include 234 beds at the [facility]


   Last Admin: 17 08:56 Dose:  Not Given











- Labs


Labs: 


 





 17 14:51 





 17 14:51 











- Constitutional


Appears: In Acute Distress (from pain), Other (Obese)





- Head Exam


Head Exam: ATRAUMATIC, NORMAL INSPECTION





- Eye Exam


Eye Exam: Normal appearance





- ENT Exam


ENT Exam: Mucous Membranes Moist, Normal Exam





- Neck Exam


Neck Exam: Normal Inspection





- Respiratory Exam


Respiratory Exam: Clear to Ausculation Bilateral, NORMAL BREATHING PATTERN.  

absent: Rhonchi, Wheezes





- Cardiovascular Exam


Cardiovascular Exam: REGULAR RHYTHM, +S1, +S2


Additional comments: 





slight tachycardia





- GI/Abdominal Exam


GI & Abdominal Exam: Soft, Normal Bowel Sounds


Additional comments: 





Diffuse tenderness in lower abdomen( L>R), no guarding or rebound tenderness.





- Extremities Exam


Extremities Exam: Normal Capillary Refill.  absent: Pedal Edema





- Neurological Exam


Neurological Exam: Alert, Awake, CN II-XII Intact, Oriented x3





Assessment and Plan





- Assessment and Plan (Free Text)


Assessment: 


Assessment and Plan: 





17 y/o  with no significant medical history being admitted for lower 

abdominal pain with ultrasound finding of effusion cannot rule out ectopic 

pregnancy or normal IUP 








1. Acute Lower abdominal pain





OR for aparotomy, removal of ectopic pregnancy if present, removal of pelvic 

mass.


TVUS: heterogeneous vascular 15.6 x10.6x 10 cm lesion at RLQ; pleural effusion; 

no evidence of intrauterine gestational sac


pain management as ordered


Beta HCG Quant: 10.75


monitor





2. Elevated liver enzymes


-f/u with PCP outpatient





3. Diet


  - NPO





4. DVT prophylaxis 


   -SCds

## 2017-09-21 NOTE — CP.PCM.CON
History of Present Illness





- History of Present Illness


History of Present Illness: 





Pt seen and examined this morning at bedside, no acute overnight events, 

reports mild pain on LLQ well controlled with medications, the pain is 

aggravated with movement and better in rest. States slept well during night. 

Denies F/N/V, no vaginal bleeding, no urinary symptoms, no headache, sob or cp.





Review of Systems





- Review of Systems


Review of Systems: 





As per HPI





Past Patient History





- Past Medical History & Family History


Past Medical History?: No





- Past Social History


Smoking Status: Never Smoked





- CARDIAC


Hx Cardiac Disorders: No





- PULMONARY


Hx Respiratory Disorders: No





- NEUROLOGICAL


Hx Neurological Disorder: No





- HEENT


Hx HEENT Problems: No





- RENAL


Hx Chronic Kidney Disease: No





- ENDOCRINE/METABOLIC


Hx Endocrine Disorders: No





- HEMATOLOGICAL/ONCOLOGICAL


Hx Blood Disorders: No


Hx AIDS: No


Hx Human Immunodeficiency Virus (HIV): No





- INTEGUMENTARY


Hx Dermatological Problems: No





- MUSCULOSKELETAL/RHEUMATOLOGICAL


Hx Musculoskeletal Disorders: No


Hx Falls: No





- GASTROINTESTINAL


Hx Gastrointestinal Disorders: No





- GENITOURINARY/GYNECOLOGICAL


Hx Reproductive Disorders: Yes (Uterine fibroids)





- PSYCHIATRIC


Hx Psychophysiologic Disorder: No


Hx Substance Use: No





- SURGICAL HISTORY


Hx Surgeries: No





- ANESTHESIA


Hx Anesthesia: No


Hx Anesthesia Reactions: No


Hx Malignant Hyperthermia: No


Has any member of the family had a problem w/ anesthesia?: No





Meds


Allergies/Adverse Reactions: 


 Allergies











Allergy/AdvReac Type Severity Reaction Status Date / Time


 


No Known Allergies Allergy   Verified 09/20/17 13:58














- Medications


Medications: 


 Current Medications





Acetaminophen (Tylenol 325mg Tab)  650 mg PO Q6 PRN


   PRN Reason: Pain, Mild (1-3)


Ferrous Sulfate (Feosol)  325 mg PO BID [facility]


Influenza Virus Vaccine (Afluria (Pf)(18yr & Older))  0.5 ml IM .ONCE ONE


   Stop: 09/21/17 06:01


Prenatal Multivit/Folic Acid/Iron (Prenatal)  1 tab PO DAILY [facility]











Physical Exam





- Constitutional


Appears: Well, No Acute Distress





- Head Exam


Head Exam: ATRAUMATIC, NORMOCEPHALIC





- Respiratory Exam


Respiratory Exam: Clear to Auscultation Bilateral.  absent: Rales, Rhonchi, 

Wheezes





- Cardiovascular Exam


Cardiovascular Exam: REGULAR RHYTHM, +S1, +S2





- GI/Abdominal Exam


GI & Abdominal Exam: Normal Bowel Sounds, Soft, Tenderness (In LLQ during 

palpation.).  absent: Distended





- Neurological Exam


Neurological exam: Alert, CN II-XII Intact, Oriented x3





Results





- Vital Signs


Recent Vital Signs: 


 Last Vital Signs











Temp  98.7 F   09/21/17 00:29


 


Pulse  110 H  09/21/17 02:21


 


Resp  18   09/21/17 02:21


 


BP  110/75   09/21/17 00:29


 


Pulse Ox  96   09/21/17 02:21














- Labs


Result Diagrams: 


 09/20/17 14:51





 09/20/17 14:51


Labs: 


 Laboratory Results - last 24 hr











  09/20/17 09/20/17 09/20/17





  14:51 14:51 15:00


 


WBC   9.4  D 


 


RBC   5.26 H 


 


Hgb   11.5 L D 


 


Hct   35.9 


 


MCV   68.3 L D 


 


MCH   21.8 L 


 


MCHC   31.9 L 


 


RDW   17.3 H 


 


Plt Count   532 H D 


 


MPV   8.2 


 


Neut % (Auto)   76.9 H 


 


Lymph % (Auto)   14.5 L 


 


Mono % (Auto)   5.2 


 


Eos % (Auto)   2.6 


 


Baso % (Auto)   0.8 


 


Neut #   7.3 H 


 


Lymph #   1.4 


 


Mono #   0.5 


 


Eos #   0.2 


 


Baso #   0.1 


 


Sodium  142  


 


Potassium  4.2  


 


Chloride  105  


 


Carbon Dioxide  24  


 


Anion Gap  17  


 


BUN  18 H  


 


Creatinine  0.7  


 


Est GFR ( Amer)  > 60  


 


Est GFR (Non-Af Amer)  > 60  


 


Random Glucose  103  


 


Calcium  9.7  


 


Total Bilirubin  0.5  


 


AST  55 H  


 


ALT  63 H D  


 


Alkaline Phosphatase  192 H  


 


Total Protein  8.8 H  


 


Albumin  4.6  


 


Globulin  4.2 H  


 


Albumin/Globulin Ratio  1.1  


 


Beta HCG, Quant   


 


Blood Type    O POSITIVE


 


Antibody Screen    Negative


 


BBK History Checked    Patient has bt














  09/20/17





  15:00


 


WBC 


 


RBC 


 


Hgb 


 


Hct 


 


MCV 


 


MCH 


 


MCHC 


 


RDW 


 


Plt Count 


 


MPV 


 


Neut % (Auto) 


 


Lymph % (Auto) 


 


Mono % (Auto) 


 


Eos % (Auto) 


 


Baso % (Auto) 


 


Neut # 


 


Lymph # 


 


Mono # 


 


Eos # 


 


Baso # 


 


Sodium 


 


Potassium 


 


Chloride 


 


Carbon Dioxide 


 


Anion Gap 


 


BUN 


 


Creatinine 


 


Est GFR ( Amer) 


 


Est GFR (Non-Af Amer) 


 


Random Glucose 


 


Calcium 


 


Total Bilirubin 


 


AST 


 


ALT 


 


Alkaline Phosphatase 


 


Total Protein 


 


Albumin 


 


Globulin 


 


Albumin/Globulin Ratio 


 


Beta HCG, Quant  10.75


 


Blood Type 


 


Antibody Screen 


 


BBK History Checked 














Assessment & Plan





- Assessment and Plan (Free Text)


Assessment: 











1)Left lower quadrant pain


Left sided adnexal mass by US on 3/22/2017 during her preg


Large amount of hypoechoic free fluid in pelvis


Positive pregnancy test with Q hCG= 11


Right Sided Pleural effusion


Right sided pelvic mass by US





Plan:


Pelvic and TVUS  with abdominal us today


F/U cbc


Needs f/u QHCG. 





Case d/w Dr Hurst.


Henri Miner PGY 1

## 2017-09-21 NOTE — CP.PCM.CON
History of Present Illness





- History of Present Illness


History of Present Illness: 





Patient seen and examined. Patient reports continued pain slightly worsened. I 

discussed with patient ultrasound findings.


Ultrasound reports large amount of pelvic free fluid as well as 15 cm pelvic 

mass. Patient had previously diagnosed uterine fibroid during pregnancy in May 

2017. I discussed with patient that this free fluid may be from the 

hemoperitoneum caused by an ectopic pregnancy, but this is not completely clear 

due to the fact that her hCG level is so low. Regardless, due to ultrasound 

findings as well as her worsening symptoms, patient needs surgical intervention.


Patient consented for laparotomy, removal of ectopic pregnancy if present, 

removal of pelvic mass. I discussed with patient all the risks, benefits, 

alternatives of surgery. All patient questions answered.





Past Patient History





- Past Medical History & Family History


Past Medical History?: No





- Past Social History


Smoking Status: Never Smoked





- CARDIAC


Hx Cardiac Disorders: No





- PULMONARY


Hx Respiratory Disorders: No





- NEUROLOGICAL


Hx Neurological Disorder: No





- HEENT


Hx HEENT Problems: No





- RENAL


Hx Chronic Kidney Disease: No





- ENDOCRINE/METABOLIC


Hx Endocrine Disorders: No





- HEMATOLOGICAL/ONCOLOGICAL


Hx Blood Disorders: No


Hx AIDS: No


Hx Human Immunodeficiency Virus (HIV): No





- INTEGUMENTARY


Hx Dermatological Problems: No





- MUSCULOSKELETAL/RHEUMATOLOGICAL


Hx Musculoskeletal Disorders: No


Hx Falls: No





- GASTROINTESTINAL


Hx Gastrointestinal Disorders: No





- GENITOURINARY/GYNECOLOGICAL


Hx Reproductive Disorders: Yes (Uterine fibroids)





- PSYCHIATRIC


Hx Psychophysiologic Disorder: No


Hx Substance Use: No





- SURGICAL HISTORY


Hx Surgeries: No





- ANESTHESIA


Hx Anesthesia: No


Hx Anesthesia Reactions: No


Hx Malignant Hyperthermia: No


Has any member of the family had a problem w/ anesthesia?: No





Meds


Allergies/Adverse Reactions: 


 Allergies











Allergy/AdvReac Type Severity Reaction Status Date / Time


 


No Known Allergies Allergy   Verified 09/20/17 13:58














- Medications


Medications: 


 Current Medications





Acetaminophen (Tylenol 325mg Tab)  650 mg PO Q6 PRN


   PRN Reason: Pain, Mild (1-3)


   Last Admin: 09/21/17 06:53 Dose:  650 mg


Ferrous Sulfate (Feosol)  325 mg PO BID [facility]


   Last Admin: 09/21/17 08:55 Dose:  Not Given


Prenatal Multivit/Folic Acid/Iron (Prenatal)  1 tab PO DAILY [facility]


   Last Admin: 09/21/17 08:56 Dose:  Not Given











Physical Exam





- Eye Exam


Eye Exam: Normal appearance





- ENT Exam


ENT Exam: Mucous Membranes Moist





- Respiratory Exam


Respiratory Exam: Clear to Auscultation Bilateral, NORMAL BREATHING PATTERN





- Cardiovascular Exam


Cardiovascular Exam: REGULAR RHYTHM





- GI/Abdominal Exam


Additional comments: 





Positive diffuse lower abdominal tenderness, positive guarding, positive 

rebound. Nondistended. Normal bowel sounds.





Results





- Vital Signs


Recent Vital Signs: 


 Last Vital Signs











Temp  98.2 F   09/21/17 08:00


 


Pulse  121 H  09/21/17 08:00


 


Resp  20   09/21/17 08:00


 


BP  120/80   09/21/17 08:00


 


Pulse Ox  95   09/21/17 08:00














- Labs


Result Diagrams: 


 09/20/17 14:51





 09/20/17 14:51


Labs: 


 Laboratory Results - last 24 hr











  09/20/17 09/20/17 09/20/17





  14:51 14:51 15:00


 


WBC   9.4  D 


 


RBC   5.26 H 


 


Hgb   11.5 L D 


 


Hct   35.9 


 


MCV   68.3 L D 


 


MCH   21.8 L 


 


MCHC   31.9 L 


 


RDW   17.3 H 


 


Plt Count   532 H D 


 


MPV   8.2 


 


Neut % (Auto)   76.9 H 


 


Lymph % (Auto)   14.5 L 


 


Mono % (Auto)   5.2 


 


Eos % (Auto)   2.6 


 


Baso % (Auto)   0.8 


 


Neut #   7.3 H 


 


Lymph #   1.4 


 


Mono #   0.5 


 


Eos #   0.2 


 


Baso #   0.1 


 


Sodium  142  


 


Potassium  4.2  


 


Chloride  105  


 


Carbon Dioxide  24  


 


Anion Gap  17  


 


BUN  18 H  


 


Creatinine  0.7  


 


Est GFR ( Amer)  > 60  


 


Est GFR (Non-Af Amer)  > 60  


 


Random Glucose  103  


 


Calcium  9.7  


 


Total Bilirubin  0.5  


 


AST  55 H  


 


ALT  63 H D  


 


Alkaline Phosphatase  192 H  


 


Total Protein  8.8 H  


 


Albumin  4.6  


 


Globulin  4.2 H  


 


Albumin/Globulin Ratio  1.1  


 


Beta HCG, Quant   


 


Blood Type    O POSITIVE


 


Antibody Screen    Negative


 


BBK History Checked    Patient has bt














  09/20/17





  15:00


 


WBC 


 


RBC 


 


Hgb 


 


Hct 


 


MCV 


 


MCH 


 


MCHC 


 


RDW 


 


Plt Count 


 


MPV 


 


Neut % (Auto) 


 


Lymph % (Auto) 


 


Mono % (Auto) 


 


Eos % (Auto) 


 


Baso % (Auto) 


 


Neut # 


 


Lymph # 


 


Mono # 


 


Eos # 


 


Baso # 


 


Sodium 


 


Potassium 


 


Chloride 


 


Carbon Dioxide 


 


Anion Gap 


 


BUN 


 


Creatinine 


 


Est GFR ( Amer) 


 


Est GFR (Non-Af Amer) 


 


Random Glucose 


 


Calcium 


 


Total Bilirubin 


 


AST 


 


ALT 


 


Alkaline Phosphatase 


 


Total Protein 


 


Albumin 


 


Globulin 


 


Albumin/Globulin Ratio 


 


Beta HCG, Quant  10.75


 


Blood Type 


 


Antibody Screen 


 


BBK History Checked 














- Imaging and Cardiology


  ** US - abdomen


Status: Image reviewed by me, Report reviewed by me





Assessment & Plan





- Assessment and Plan (Free Text)


Assessment: 





Acute abdominal pain, possible ectopic pregnancy, pelvic mass possible uterine 

fibroid.


Plan: 





As above.


Routine postop observation and care.





- Date & Time


Date: 09/21/17


Time: 10:18

## 2017-09-21 NOTE — CARD
--------------- APPROVED REPORT --------------





EKG Measurement

Heart [id number]

LA [id number]

[id number]

[id number]

[id number]



<Conclusion>

Sinus tachycardia

Otherwise normal ECG

## 2017-09-22 NOTE — CP.PCM.PN
Subjective





- Date & Time of Evaluation


Date of Evaluation: 09/22/17


Time of Evaluation: 05:32





- Subjective


Subjective: 


Patient seen and examined at bedside this morning, s/p laparotomy and L 

salpingo oopherectomy, no acute overnight events, reports mild pain in incision 

zone but well controlled with pain medications, reports mild vaginal spotting, 

tolerating well liquid diet, denies fever, nausea, vomiting, no sob, cp, 

headache,no urinary symptoms.





Objective





- Vital Signs/Intake and Output


Vital Signs (last 24 hours): 


 











Temp Pulse Resp BP Pulse Ox


 


 98.6 F   99   18   105/68 L  99 


 


 09/22/17 04:46  09/22/17 04:46  09/22/17 04:46  09/22/17 04:46  09/22/17 04:46








Intake and Output: 


 











 09/21/17 09/22/17





 18:59 06:59


 


Intake Total 1680 975


 


Output Total 60 850


 


Balance 1620 125














- Medications


Medications: 


 Current Medications





Acetaminophen (Tylenol 325mg Tab)  650 mg PO Q6 PRN


   PRN Reason: Pain, Mild (1-3)


   Last Admin: 09/21/17 06:53 Dose:  650 mg


Ferrous Sulfate (Feosol)  325 mg PO BID [facility]


   Last Admin: 09/21/17 17:49 Dose:  325 mg


Hydromorphone HCl (Dilaudid 0.2 Mg/Ml Pca)  6 mg IV Q4 [facility]


   PRN Reason: Protocol


   Last Admin: 09/22/17 02:10 Dose:  6 mg


Sodium Chloride (Sodium Chloride 0.9%)  1,000 mls @ 100 mls/hr IV .Q10H [facility]


   Last Admin: 09/21/17 23:51 Dose:  100 mls/hr


Prenatal Multivit/Folic Acid/Iron (Prenatal)  1 tab PO DAILY [facility]


   Last Admin: 09/21/17 08:56 Dose:  Not Given











- Labs


Labs: 


 





 09/20/17 14:51 





 09/20/17 14:51 











- Constitutional


Appears: No Acute Distress





- Head Exam


Head Exam: ATRAUMATIC, NORMOCEPHALIC





- Respiratory Exam


Respiratory Exam: Clear to Ausculation Bilateral





- Cardiovascular Exam


Cardiovascular Exam: REGULAR RHYTHM, +S1, +S2.  absent: Murmur





- GI/Abdominal Exam


GI & Abdominal Exam: Soft, Tenderness (mild tenderness in incision zone on 

palpation. Dressing clean.), Normal Bowel Sounds





- Extremities Exam


Extremities Exam: absent: Calf Tenderness





Assessment and Plan





- Assessment and Plan (Free Text)


Assessment: 





19 yo F patient s/p Laparotomy and L salpingo oopherectomy doing well on POD 1





Routine postop observation and care.


Advance to regular diet today


Pain management

## 2017-09-22 NOTE — PCM.SURG1
Surgeon's Initial Post Op Note





- Surgeon's Notes


Surgeon: Roro


Assistant: Ar


Type of Anesthesia: General Endo


Anesthesia Administered By: Yapp


Pre-Operative Diagnosis: Acute abdomen, pelvic mass, pelvic free fluid


Operative Findings: Left adnexal torsion with ~20cmleft adnexal mass, possible 

fibroid.  Normal uterus, normal right tube and ovary


Post-Operative Diagnosis: Same + left adnexal torsion


Operation Performed: Laparotomy, LSO


Specimen/Specimens Removed: Left Adnexa


Estimated Blood Loss: EBL {In ML}: 200


Blood Products Given: N/A


Drains Used: No Drains


Post-Op Condition: Good


Date of Surgery/Procedure: 09/21/17


Time of Surgery/Procedure: 11:00

## 2017-09-22 NOTE — CP.PCM.CON
History of Present Illness





- History of Present Illness


History of Present Illness: 


17 yo  POD1 Examined pt at bedside today at 17:15; s/p: L laparotomy and 

L salpino oophorectomy due to L adnexal torsion.  She was laying comfortably in 

her bed. Denies any significant events during the day. Shares of pain at 

incision site and L sided above the incision bandage. Pain control via pain 

management. Last vaginal pad was removed 1 hour ago: with light spotting of 

blood per vagina. She denies nausea, vomiting; Currently, negative for bowel 

movement or passing gas. She's not yet able to ambulate. SCD for DVT 

prophylaxis. Tolerating regular diet.





CBC: 11.5/35.9 trended down to 7.0/22.2 2 units of leuk reduced blood 

transfused today. 


HC2017: 10.75; 2017: 5.14





General: pleasant, in no acute distress 


HEENT: normocephalic, PERRLA; AAOx3 


Heart: no murmurs, regular rate and rhythm, S1, S2 normal. 


Lungs: clear to auscultation bilaterally, no wheezing 


Abdomen: tender at incision site; Abdominal bandage was in place; dry and w/o 

absorption of blood. 


CVA: negative





Continue to monitor CBC in the AM for trend in Hgb.


Continue with Pain management. 


Encouraged ambulation when she is regaining strength.





RICHARD PGY1





Past Patient History





- Past Medical History & Family History


Past Medical History?: No





- Past Social History


Smoking Status: Never Smoked





- CARDIAC


Hx Cardiac Disorders: No





- PULMONARY


Hx Respiratory Disorders: No





- NEUROLOGICAL


Hx Neurological Disorder: No





- HEENT


Hx HEENT Problems: No





- RENAL


Hx Chronic Kidney Disease: No





- ENDOCRINE/METABOLIC


Hx Endocrine Disorders: No





- HEMATOLOGICAL/ONCOLOGICAL


Hx Blood Disorders: No


Hx AIDS: No


Hx Human Immunodeficiency Virus (HIV): No





- INTEGUMENTARY


Hx Dermatological Problems: No





- MUSCULOSKELETAL/RHEUMATOLOGICAL


Hx Musculoskeletal Disorders: No


Hx Falls: No





- GASTROINTESTINAL


Hx Gastrointestinal Disorders: No





- GENITOURINARY/GYNECOLOGICAL


Hx Reproductive Disorders: Yes (Uterine fibroids)





- PSYCHIATRIC


Hx Psychophysiologic Disorder: No


Hx Substance Use: No





- SURGICAL HISTORY


Hx Surgeries: No





- ANESTHESIA


Hx Anesthesia: No


Hx Anesthesia Reactions: No


Hx Malignant Hyperthermia: No


Has any member of the family had a problem w/ anesthesia?: No





Meds


Allergies/Adverse Reactions: 


 Allergies











Allergy/AdvReac Type Severity Reaction Status Date / Time


 


No Known Allergies Allergy   Verified 17 13:58














- Medications


Medications: 


 Current Medications





Acetaminophen (Tylenol 325mg Tab)  650 mg PO Q6 PRN


   PRN Reason: Pain, Mild (1-3)


   Last Admin: 17 06:53 Dose:  650 mg


Enoxaparin Sodium (Lovenox)  40 mg SC DAILY [facility]


   PRN Reason: Protocol


Ferrous Sulfate (Feosol)  325 mg PO BID [facility]


   Last Admin: 17 16:51 Dose:  325 mg


Guaifenesin/Dextromethorphan (Robitussin Dm)  10 ml PO Q6 PRN


   PRN Reason: Cough


Sodium Chloride (Sodium Chloride 0.9%)  1,000 mls @ 100 mls/hr IV .Q10H [facility]


   Last Admin: 17 09:50 Dose:  Not Given


Morphine Sulfate (Morphine)  4 mg IVP Q6 PRN


   PRN Reason: Pain, moderate (4-7)


Morphine Sulfate (Morphine)  8 mg IVP Q6 PRN


   PRN Reason: Pain, severe (8-10)


   Last Admin: 17 16:44 Dose:  8 mg


Prenatal Multivit/Folic Acid/Iron (Prenatal)  1 tab PO DAILY [facility]


   Last Admin: 17 09:49 Dose:  1 tab











Results





- Vital Signs


Recent Vital Signs: 


 Last Vital Signs











Temp  98.0 F   17 15:42


 


Pulse  115 H  17 15:42


 


Resp  20   17 15:42


 


BP  118/82   17 15:42


 


Pulse Ox  100   17 15:42














- Labs


Result Diagrams: 


 17 05:00





 17 05:00


Labs: 


 Laboratory Results - last 24 hr











  17





  15:00 19:00 05:00


 


WBC    9.9


 


RBC    3.16 L


 


Hgb    7.0 L D


 


Hct    22.2 L


 


MCV    70.3 L D


 


MCH    22.1 L


 


MCHC    31.5 L


 


RDW    17.2 H


 


Plt Count    343  D


 


MPV    8.7


 


Neut % (Auto)    75.1 H


 


Lymph % (Auto)    15.2 L


 


Mono % (Auto)    8.8


 


Eos % (Auto)    0.5


 


Baso % (Auto)    0.4


 


Neut #    7.4 H


 


Lymph #    1.5


 


Mono #    0.9 H


 


Eos #    0.1


 


Baso #    0.0


 


Sodium   


 


Potassium   


 


Chloride   


 


Carbon Dioxide   


 


Anion Gap   


 


BUN   


 


Creatinine   


 


Est GFR ( Amer)   


 


Est GFR (Non-Af Amer)   


 


Random Glucose   


 


Calcium   


 


Beta HCG, Quant   


 


C.trachomatis RNA (TMA)   Not detected 


 


N.gonorrhoeae RNA (TMA)   Not detected 


 


Blood Type  O POSITIVE  


 


Antibody Screen  Negative  


 


Crossmatch  See Detail  


 


BBK History Checked  Patient has bt  














  17





  05:00


 


WBC 


 


RBC 


 


Hgb 


 


Hct 


 


MCV 


 


MCH 


 


MCHC 


 


RDW 


 


Plt Count 


 


MPV 


 


Neut % (Auto) 


 


Lymph % (Auto) 


 


Mono % (Auto) 


 


Eos % (Auto) 


 


Baso % (Auto) 


 


Neut # 


 


Lymph # 


 


Mono # 


 


Eos # 


 


Baso # 


 


Sodium  140


 


Potassium  3.4 L


 


Chloride  105


 


Carbon Dioxide  27


 


Anion Gap  11


 


BUN  9


 


Creatinine  0.6 L


 


Est GFR ( Amer)  > 60


 


Est GFR (Non-Af Amer)  > 60


 


Random Glucose  105


 


Calcium  7.8 L


 


Beta HCG, Quant  5.14


 


C.trachomatis RNA (TMA) 


 


N.gonorrhoeae RNA (TMA) 


 


Blood Type 


 


Antibody Screen 


 


Crossmatch 


 


BBK History Checked

## 2017-09-22 NOTE — CP.PCM.PN
Subjective





- Date & Time of Evaluation


Date of Evaluation: 17


Time of Evaluation: 08:20





- Subjective


Subjective: 





Pt denies pain, reports tolerating clear liquids, has not walked yet





Objective





- Vital Signs/Intake and Output


Vital Signs (last 24 hours): 


 











Temp Pulse Resp BP Pulse Ox


 


 98.8 F   69   20   116/79   95 


 


 17 08:18  17 08:18  17 08:18  17 08:18  17 08:18








Intake and Output: 


 











 17





 06:59 18:59


 


Intake Total 975 


 


Output Total 850 


 


Balance 125 














- Medications


Medications: 


 Current Medications





Acetaminophen (Tylenol 325mg Tab)  650 mg PO Q6 PRN


   PRN Reason: Pain, Mild (1-3)


   Last Admin: 17 06:53 Dose:  650 mg


Ferrous Sulfate (Feosol)  325 mg PO BID [facility]


   Last Admin: 17 17:49 Dose:  325 mg


Hydromorphone HCl (Dilaudid 0.2 Mg/Ml Pca)  6 mg IV Q4 [facility]


   PRN Reason: Protocol


   Last Admin: 17 02:10 Dose:  6 mg


Sodium Chloride (Sodium Chloride 0.9%)  1,000 mls @ 100 mls/hr IV .Q10H [facility]


   Last Admin: 17 23:51 Dose:  100 mls/hr


Prenatal Multivit/Folic Acid/Iron (Prenatal)  1 tab PO DAILY [facility]


   Last Admin: 17 08:56 Dose:  Not Given











- Labs


Labs: 


 





 17 05:00 





 17 05:00 











- Constitutional


Appears: No Acute Distress





- GI/Abdominal Exam


Additional comments: 





soft, NT, incision w/ bandage in place 





- Extremities Exam


Additional comments: 





NT 





Assessment and Plan





- Assessment and Plan (Free Text)


Assessment: 





17 yo  POD 1 s/p ELAP and large left adnexal mass removed, doing well  


Explained to the pt what occurred during the surgery 


Pt asked about contraception, told to f/u in the clinic 


Will follow HCG quantitative this am

## 2017-09-22 NOTE — CP.PCM.PN
Subjective





- Date & Time of Evaluation


Date of Evaluation: 17


Time of Evaluation: 07:55





- Subjective


Subjective: 





Pt seen and examined at bedside this AM. Pt reports mild pain at the surgical 

site when she coughs. Denies dyspnea, chest discomport, fever, chills, calf pain

, dizziness or blurry vision. Crook was out last night and pt went to bathroom 

to urinate once last night. Denies passing gas yet. Tolerating PO liquid.  





Objective





- Vital Signs/Intake and Output


Vital Signs (last 24 hours): 


 











Temp Pulse Resp BP Pulse Ox


 


 98.8 F   69   20   116/79   95 


 


 17 08:18  17 08:18  17 08:18  17 08:18  17 08:18








Intake and Output: 


 











 17





 06:59 18:59


 


Intake Total 975 


 


Output Total 850 


 


Balance 125 














- Medications


Medications: 


 Current Medications





Acetaminophen (Tylenol 325mg Tab)  650 mg PO Q6 PRN


   PRN Reason: Pain, Mild (1-3)


   Last Admin: 17 06:53 Dose:  650 mg


Ferrous Sulfate (Feosol)  325 mg PO BID [facility]


   Last Admin: 17 17:49 Dose:  325 mg


Hydromorphone HCl (Dilaudid 0.2 Mg/Ml Pca)  6 mg IV Q4 [facility]


   PRN Reason: Protocol


   Last Admin: 17 02:10 Dose:  6 mg


Sodium Chloride (Sodium Chloride 0.9%)  1,000 mls @ 100 mls/hr IV .Q10H [facility]


   Last Admin: 17 23:51 Dose:  100 mls/hr


Prenatal Multivit/Folic Acid/Iron (Prenatal)  1 tab PO DAILY [facility]


   Last Admin: 17 08:56 Dose:  Not Given











- Labs


Labs: 


 





 17 05:00 





 17 05:00 











- Constitutional


Appears: Non-toxic, No Acute Distress, Other (obese)





- Head Exam


Head Exam: ATRAUMATIC, NORMAL INSPECTION, NORMOCEPHALIC





- Eye Exam


Eye Exam: Normal appearance





- ENT Exam


ENT Exam: Normal Exam





- Neck Exam


Neck Exam: Normal Inspection





- Respiratory Exam


Respiratory Exam: Clear to Ausculation Bilateral, NORMAL BREATHING PATTERN.  

absent: Rales, Rhonchi, Wheezes





- Cardiovascular Exam


Cardiovascular Exam: REGULAR RHYTHM, +S1, +S2





- GI/Abdominal Exam


GI & Abdominal Exam: Soft, Normal Bowel Sounds


Additional comments: 





surgical site is covered with bandage. looks intact, dry and clear. No oozing 

or blood seen. 





- Extremities Exam


Extremities Exam: Normal Capillary Refill, Normal Inspection.  absent: Calf 

Tenderness, Pedal Edema





- Neurological Exam


Neurological Exam: Alert, Awake, Oriented x3





- Psychiatric Exam


Psychiatric exam: Normal Affect, Normal Mood





Assessment and Plan





- Assessment and Plan (Free Text)


Assessment: 


Assessment and Plan: 


17 y/o  with no significant medical history being admitted for lower 

abdominal pain s/p Laparotomy and L salpingo oopherectomy doing well on POD 1








1. Acute Left Lower abdominal pain


-s/p Laparotomy and L salpingo oopherectomy on 17.


-approximately 20 cm left adnexal mass, possible fibrioid was removed.


-pain management as ordered


-Beta HCG Quant: 5.14 this AM and 10.75 on 17.





2. Anemia


-H&H: 7.0/22.2 today; yesterday H&H 11.5/35.9


-Pt consented for 2 units pRBC


-blood transfusion today.





3. right sided pleural effusion on TSVUS


- CXR ordered today.





4. Elevated liver enzymes


-f/u with PCP outpatient





3. Diet


  -Clear liquid diet.





4. DVT prophylaxis 


   -SCDS


   -Lovenox 40 mg SC daily.

## 2017-09-23 NOTE — OP
PROCEDURE DATE:  09/21/2017



PREOPERATIVE DIAGNOSES:  Acute abdominal pain, pelvic mass, pelvic free

fluid, positive pregnancy test, and possible ectopic pregnancy.



POSTOPERATIVE DIAGNOSES:  Left adnexal mass with left adnexal torsion.



OPERATION PERFORMED:  Laparotomy, left salpingo-oophorectomy.



SURGEON:  Roddy Read MD



ASSISTANT:  Tiffany Joyner MD.  Dr. Joyner was present from the

beginning of the procedure to the end of the procedure.  Dr. Joyner was

integral in exposing the surgical field, controlling intraoperative

bleeding, and surgical removal of pelvic mass.



TYPE OF ANESTHESIA:  General.



ANESTHESIA ADMINISTERED BY:  Dr. Miller.



IV FLUID INTAKE:  1100 mL of lactated Ringer's.



ESTIMATED BLOOD LOSS:  200 mL.



URINE OUTPUT:  50 mL of clear urine.



COMPLICATIONS:  None.



DESCRIPTION OF PROCEDURE:  The patient was taken to the operating room,

where general anesthesia was found to be adequate.  The patient was prepped

and draped in normal sterile fashion in the dorsal supine position.  A

Pfannenstiel skin incision was made with scalpel.  This was carried down

through to the underlying layer of fascia with the scalpel.  Midline defect

was made in fascial layer with a scalpel.  The fascial incision was then

extended bilaterally sharply with curved Drew scissors.  The fascial layer

was then  from the underlying rectus muscles, both bluntly and

sharply with curved Drew scissors.  The rectus muscles were  at

the midline.  The peritoneum was then identified, tented up with Shirley

clamps x2, entered sharply with Metzenbaum scissors.  This peritoneal

incision was then extended superiorly and inferiorly with good

visualization of the urinary bladder.  The abdomen and pelvis were explored

and the above findings noted and approximately 20 cm left adnexal mass was

found and exteriorized.  This mass was found to be twisted on its own

pedicle three times.  The mass was found to be necrotic.  At this time,

decision was made for removal of mass.  The pelvic mass was clamped with

Aniyah clamps x3 at the level of the infundibulopelvic ligament and

suspensory ligament of the ovary and fallopian tube.  The mass was sharply

transected with curved Drew scissors and removed.  The surgical pedicle was

suture ligated with 0 Vicryl x3.  Reinspection of this surgical pedicle

proved hemostasis.



The abdomen and pelvis were irrigated with copious amounts of warm normal

saline.  Reinspection of the surgical pedicle showed hemostasis.  All

instruments were removed from the patient.



The peritoneal layer was closed with a running stitch of 2-0 chromic.  The

rectus muscles were reapproximated at the midline with a running stitch of

2-0 chromic.  The fascial layer was closed with a running stitch of 0

Vicryl.  Subcutaneous tissue was closed in two layers with a running stitch

of 3-0 plain.  The skin was closed with staples.



The patient tolerated the procedure well.  All sponge, lap, and needle

counts were correct x2.  The patient was given 2 g of Ancef just prior to

the beginning of the procedure.  There were no complications.  The patient

was taken to recovery room in awake and stable condition.





__________________________________________

Roddy Read MD



DD:  09/22/2017 13:03:56

DT:  09/22/2017 14:24:35

Job # [id number]

## 2017-09-23 NOTE — CP.PCM.CON
<Kadeem Ocampo - Last Filed: 17 07:40>





Past Patient History





- Past Medical History & Family History


Past Medical History?: No





- Past Social History


Smoking Status: Never Smoked





- CARDIAC


Hx Cardiac Disorders: No





- PULMONARY


Hx Respiratory Disorders: No





- NEUROLOGICAL


Hx Neurological Disorder: No





- HEENT


Hx HEENT Problems: No





- RENAL


Hx Chronic Kidney Disease: No





- ENDOCRINE/METABOLIC


Hx Endocrine Disorders: No





- HEMATOLOGICAL/ONCOLOGICAL


Hx Blood Disorders: No


Hx AIDS: No


Hx Human Immunodeficiency Virus (HIV): No





- INTEGUMENTARY


Hx Dermatological Problems: No





- MUSCULOSKELETAL/RHEUMATOLOGICAL


Hx Musculoskeletal Disorders: No


Hx Falls: No





- GASTROINTESTINAL


Hx Gastrointestinal Disorders: No





- GENITOURINARY/GYNECOLOGICAL


Hx Reproductive Disorders: Yes (Uterine fibroids)





- PSYCHIATRIC


Hx Psychophysiologic Disorder: No


Hx Substance Use: No





- SURGICAL HISTORY


Hx Surgeries: No





- ANESTHESIA


Hx Anesthesia: No


Hx Anesthesia Reactions: No


Hx Malignant Hyperthermia: No


Has any member of the family had a problem w/ anesthesia?: No





Meds


Allergies/Adverse Reactions: 


 Allergies











Allergy/AdvReac Type Severity Reaction Status Date / Time


 


No Known Allergies Allergy   Verified 17 13:58














- Medications


Medications: 


 Current Medications





Acetaminophen (Tylenol 325mg Tab)  650 mg PO Q6 PRN


   PRN Reason: Pain, Mild (1-3)


   Last Admin: 17 06:53 Dose:  650 mg


Enoxaparin Sodium (Lovenox)  40 mg SC DAILY [facility]


   PRN Reason: Protocol


Ferrous Sulfate (Feosol)  325 mg PO BID [facility]


   Last Admin: 17 16:51 Dose:  325 mg


Guaifenesin/Dextromethorphan (Robitussin Dm)  10 ml PO Q6 PRN


   PRN Reason: Cough


Sodium Chloride (Sodium Chloride 0.9%)  1,000 mls @ 100 mls/hr IV .Q10H [facility]


   Last Admin: 17 03:43 Dose:  100 mls/hr


Morphine Sulfate (Morphine)  4 mg IVP Q6 PRN


   PRN Reason: Pain, moderate (4-7)


Morphine Sulfate (Morphine)  8 mg IVP Q6 PRN


   PRN Reason: Pain, severe (8-10)


   Last Admin: 17 06:13 Dose:  8 mg


Prenatal Multivit/Folic Acid/Iron (Prenatal)  1 tab PO DAILY [facility]


   Last Admin: 17 09:49 Dose:  1 tab











Results





- Vital Signs


Recent Vital Signs: 


 Last Vital Signs











Temp  99.1 F   17 05:00


 


Pulse  104   17 05:00


 


Resp  19   17 05:00


 


BP  122/84   17 05:00


 


Pulse Ox  100   17 05:00














- Labs


Result Diagrams: 


 17 05:00





 17 05:00


Labs: 


 Laboratory Results - last 24 hr











  17





  15:00 19:00 05:00


 


Sodium    140


 


Potassium    3.4 L


 


Chloride    105


 


Carbon Dioxide    27


 


Anion Gap    11


 


BUN    9


 


Creatinine    0.6 L


 


Est GFR ( Amer)    > 60


 


Est GFR (Non-Af Amer)    > 60


 


Random Glucose    105


 


Calcium    7.8 L


 


Beta HCG, Quant    5.14


 


C.trachomatis RNA (TMA)   Not detected 


 


N.gonorrhoeae RNA (TMA)   Not detected 


 


Blood Type  O POSITIVE  


 


Antibody Screen  Negative  


 


Crossmatch  See Detail  


 


BBK History Checked  Patient has bt  














Assessment & Plan





- Assessment and Plan (Free Text)


Assessment: 





17 yo  POD1 Examined pt at bedside today at 17:15; s/p: L laparotomy and 

L salpino oophorectomy due to L adnexal torsion.  She was laying comfortably in 

her bed; hasnt attempted to ambulate. Shares of mid abdominal pain rated 8/10 

non radiating, dull, started overnight and contributes it to possible 

constipation. Previous pain at incision site and L sided above dressing has 

improved. Pain control. Light spotting of blood per vagina. She denies nausea, 

vomiting. No Bowel movement but passing gas. SCD for DVT prophylaxis. 

Tolerating regular diet. O2 via nasal canula at 2L





CBC: 11.5/35.9 trended down to 7.0/22.2 2 units of leuk reduced blood 

transfused yesterday 2017. 


HC2017: 10.75; 2017: 5.14





General: pleasant, in no acute distress, AAOx3 


Heart: no murmurs, regular rate and rhythm, S1, S2 normal. 


Lungs: clear to auscultation bilaterally, no wheezing 


Abdomen: bandage was in place; dry and w/o absorption of blood. 


CVA: negative


Lower Extremities: negative for pitting edema; SCD for DVT prophylaxis; 

negative for calf pain





Continue to monitor CBC for trend in Hgb s/p 2 units of blood products.


Continue with Pain management; pt aware of constipation 2/2 meds


Encouraged ambulation techniques such as sitting on the side of her bed with 

lower extremity exercises.





RICHARD PGY1





<Demario Hurst S - Last Filed: 17 16:10>





History of Present Illness





- History of Present Illness


History of Present Illness: 





OB H:


Patient seen and examined by me with. Agree with above assessment and plan.


P:


Incentive spirometry reinforced.


Ambulation encouraged


Contraceptive options discussed with patient. She is interested in Implanon. 

She will follow up for this as an outpatient.





Meds





- Medications


Medications: 


 Current Medications





Docusate Sodium (Colace)  200 mg PO DAILY [facility]


Enoxaparin Sodium (Lovenox)  40 mg SC DAILY [facility]


   PRN Reason: Protocol


   Last Admin: 17 10:42 Dose:  40 mg


Ferrous Sulfate (Feosol)  325 mg PO BID [facility]


   Last Admin: 17 09:15 Dose:  325 mg


Guaifenesin (Mucinex La)  600 mg PO Q12 [facility]


Guaifenesin/Dextromethorphan (Robitussin Dm)  10 ml PO Q6 PRN


   PRN Reason: Cough


   Last Admin: 17 09:18 Dose:  10 ml


Ibuprofen (Motrin Tab)  600 mg PO Q6 PRN


   PRN Reason: Pain, Mild (1-3)


Oxycodone/Acetaminophen (Percocet 5/325 Mg Tab)  1 tab PO Q4 PRN


   PRN Reason: Pain, moderate (4-7)


   Stop: 17 09:11


   Last Admin: 17 12:31 Dose:  1 tab


Oxycodone/Acetaminophen (Percocet 5/325 Mg Tab)  2 tab PO Q4 PRN


   PRN Reason: Pain, moderate (4-7)


   Stop: 17 09:11











Physical Exam





- Head Exam


Head Exam: ATRAUMATIC, NORMOCEPHALIC





- Respiratory Exam


Respiratory Exam: NORMAL BREATHING PATTERN





- GI/Abdominal Exam


GI & Abdominal Exam: Soft (Pfannnensteil Incision: c/d/i).  absent: Distended, 

Guarding





Results





- Vital Signs


Recent Vital Signs: 


 Last Vital Signs











Temp  98.8 F   17 15:39


 


Pulse  85   17 15:39


 


Resp  20   17 15:39


 


BP  124/81   17 15:39


 


Pulse Ox  97   17 15:39














- Labs


Result Diagrams: 


 17 07:00





 17 04:00


Labs: 


 Laboratory Results - last 24 hr











  17





  15:00 19:00 04:00


 


WBC   


 


RBC   


 


Hgb   


 


Hct   


 


MCV   


 


MCH   


 


MCHC   


 


RDW   


 


Plt Count   


 


Sodium    142


 


Potassium    3.6


 


Chloride    105


 


Carbon Dioxide    26


 


Anion Gap    15


 


BUN    6 L


 


Creatinine    0.6 L


 


Est GFR ( Amer)    > 60


 


Est GFR (Non-Af Amer)    > 60


 


Random Glucose    91


 


Calcium    8.3 L


 


C.trachomatis RNA (TMA)   Not detected 


 


N.gonorrhoeae RNA (TMA)   Not detected 


 


Blood Type  O POSITIVE  


 


Antibody Screen  Negative  


 


Crossmatch  See Detail  


 


BBK History Checked  Patient has bt  














  17





  07:00


 


WBC  8.4


 


RBC  3.85


 


Hgb  9.0 L D


 


Hct  28.5 L


 


MCV  73.9 L D


 


MCH  23.4 L


 


MCHC  31.6 L


 


RDW  19.5 H


 


Plt Count  347


 


Sodium 


 


Potassium 


 


Chloride 


 


Carbon Dioxide 


 


Anion Gap 


 


BUN 


 


Creatinine 


 


Est GFR ( Amer) 


 


Est GFR (Non-Af Amer) 


 


Random Glucose 


 


Calcium 


 


C.trachomatis RNA (TMA) 


 


N.gonorrhoeae RNA (TMA) 


 


Blood Type 


 


Antibody Screen 


 


Crossmatch 


 


BBK History Checked 














Assessment & Plan





- Assessment and Plan (Free Text)


Assessment: 





SP

## 2017-09-23 NOTE — RAD
HISTORY:

pleural effusion and cough  



COMPARISON:

No prior.



TECHNIQUE:

Chest PA and lateral



FINDINGS:



LUNGS:

No active pulmonary disease.



PLEURA:

No significant pleural effusion identified. No pneumothorax apparent.



CARDIOVASCULAR:

Normal.



OSSEOUS STRUCTURES:

No significant abnormalities.



VISUALIZED UPPER ABDOMEN:

Normal.



OTHER FINDINGS:

None.



IMPRESSION:

No active disease. No evidence of significant pleural effusion.

## 2017-09-23 NOTE — CP.PCM.PN
Subjective





- Date & Time of Evaluation


Date of Evaluation: 17


Time of Evaluation: 13:31





- Subjective


Subjective: 





no overnight events. abd pain controlled. last IV morphine this AM. Eating/

drinkin. making urine and passing gas. Denies chest pain. Some SOB, no 

respiratory distress. 





Objective





- Vital Signs/Intake and Output


Vital Signs (last 24 hours): 


 











Temp Pulse Resp BP Pulse Ox


 


 98.4 F   100   20   124/81   98 


 


 17 13:14  17 13:14  17 13:14  17 13:14  17 13:14











- Medications


Medications: 


 Current Medications





Enoxaparin Sodium (Lovenox)  40 mg SC DAILY [facility]


   PRN Reason: Protocol


   Last Admin: 17 10:42 Dose:  40 mg


Ferrous Sulfate (Feosol)  325 mg PO BID [facility]


   Last Admin: 17 09:15 Dose:  325 mg


Guaifenesin/Dextromethorphan (Robitussin Dm)  10 ml PO Q6 PRN


   PRN Reason: Cough


   Last Admin: 17 09:18 Dose:  10 ml


Ibuprofen (Motrin Tab)  600 mg PO Q6 PRN


   PRN Reason: Pain, Mild (1-3)


Oxycodone/Acetaminophen (Percocet 5/325 Mg Tab)  1 tab PO Q4 PRN


   PRN Reason: Pain, moderate (4-7)


   Stop: 17 09:11


   Last Admin: 17 12:31 Dose:  1 tab


Oxycodone/Acetaminophen (Percocet 5/325 Mg Tab)  2 tab PO Q4 PRN


   PRN Reason: Pain, moderate (4-7)


   Stop: 17 09:11











- Labs


Labs: 


 





 17 07:00 





 17 04:00 











- Constitutional


Appears: Non-toxic, No Acute Distress





- Head Exam


Head Exam: ATRAUMATIC, NORMAL INSPECTION





- Eye Exam


Eye Exam: Normal appearance





- ENT Exam


ENT Exam: Mucous Membranes Moist





- Neck Exam


Neck Exam: Full ROM, Normal Inspection





- Respiratory Exam


Respiratory Exam: Wheezes





- Cardiovascular Exam


Cardiovascular Exam: REGULAR RHYTHM





- GI/Abdominal Exam


GI & Abdominal Exam: Soft, Tenderness





- Extremities Exam


Extremities Exam: Normal Inspection





- Back Exam


Back Exam: NORMAL INSPECTION





- Neurological Exam


Neurological Exam: Alert, Oriented x3





- Skin


Skin Exam: Dry, Warm





Assessment and Plan





- Assessment and Plan (Free Text)


Assessment: 





Assessment 


17 y/o  F admitted for suspected ectopic v ovarian torsion





Plan: PO pain control, walking, incentive spirometry





s/p Laparotomy and L salpingo oopherectomy POD#2


-stop PNV


- CXR WNL


-transition to PO pain med


-start colace


-walking


-IS


-wean NC





Anemia


-improved with 2u pRBC





Elevated liver enzymes


-f/u with PCP outpatient





DVT prophylaxis 


   -SCDS


   -Lovenox 40 mg SC daily

## 2017-09-24 NOTE — CP.PCM.DIS
Provider





- Provider


Date of Admission: 


17 15:28





Attending physician: 


Virgen Lerner MD





Primary care physician: 





Scheduled at [facility] w/ Dr. Brown on 10/5/17 at 3 pm





Consults: 





OB/GYN: DR. AWAN


Time Spent in preparation of Discharge (in minutes): 45





Diagnosis





- Discharge Diagnosis


(1) Fibroid, uterine


Status: Resolved   





(2) Ectopic pregnancy


Status: Resolved   





(3) Torsion of accessory fallopian tube


Status: Resolved   





Hospital Course





- Lab Results


Lab Results: 


 Most Recent Lab Values











WBC  9.4 K/uL (4.8-10.8)   17  05:45    


 


RBC  4.04 Mil/uL (3.80-5.20)   17  05:45    


 


Hgb  9.4 g/dL (12.0-16.0)  L  17  05:45    


 


Hct  29.6 % (34.0-47.0)  L  17  05:45    


 


MCV  73.1 fl (81.0-99.0)  L  17  05:45    


 


MCH  23.2 pg (27.0-31.0)  L  17  05:45    


 


MCHC  31.7 g/dL (33.0-37.0)  L  17  05:45    


 


RDW  19.3 % (11.5-14.5)  H  17  05:45    


 


Plt Count  373 K/uL (130-400)   17  05:45    


 


MPV  8.7 fl (7.2-11.7)   17  05:00    


 


Neut % (Auto)  75.1 % (50.0-75.0)  H  17  05:00    


 


Lymph % (Auto)  15.2 % (20.0-40.0)  L  17  05:00    


 


Mono % (Auto)  8.8 % (0.0-10.0)   17  05:00    


 


Eos % (Auto)  0.5 % (0.0-4.0)   17  05:00    


 


Baso % (Auto)  0.4 % (0.0-2.0)   17  05:00    


 


Neut #  7.4 K/uL (1.8-7.0)  H  17  05:00    


 


Lymph #  1.5 K/uL (1.0-4.3)   17  05:00    


 


Mono #  0.9 K/uL (0.0-0.8)  H  17  05:00    


 


Eos #  0.1 K/uL (0.0-0.7)   17  05:00    


 


Baso #  0.0 K/uL (0.0-0.2)   17  05:00    


 


Sodium  142 mmol/l (132-148)   17  05:45    


 


Potassium  3.5 MMOL/L (3.6-5.0)  L  17  05:45    


 


Chloride  103 mmol/L ()   17  05:45    


 


Carbon Dioxide  30 mmol/L (22-30)   17  05:45    


 


Anion Gap  13  (10-20)   17  05:45    


 


BUN  10 mg/dl (7-17)   17  05:45    


 


Creatinine  0.6 mg/dL (0.7-1.2)  L  17  05:45    


 


Est GFR ( Amer)  > 60   17  05:45    


 


Est GFR (Non-Af Amer)  > 60   17  05:45    


 


Random Glucose  102 mg/dL ()   17  05:45    


 


Calcium  8.7 mg/dL (8.4-10.2)   17  05:45    


 


Total Bilirubin  0.5 mg/dl (0.2-1.3)   17  14:51    


 


AST  55 U/L (14-36)  H  17  14:51    


 


ALT  63 U/L (9-52)  H D 17  14:51    


 


Alkaline Phosphatase  192 U/L ()  H  17  14:51    


 


Total Protein  8.8 G/DL (6.3-8.2)  H  17  14:51    


 


Albumin  4.6 g/dL (3.5-5.0)   17  14:51    


 


Globulin  4.2 gm/dL (2.2-3.9)  H  17  14:51    


 


Albumin/Globulin Ratio  1.1  (1.0-2.1)   17  14:51    


 


Beta HCG, Quant  5.14 mIU/mL  17  05:00    


 


C.trachomatis RNA (TMA)  Not detected  (Not Detected)   17  19:00    


 


N.gonorrhoeae RNA (TMA)  Not detected  (Not Detected)   17  19:00    


 


Blood Type  O POSITIVE   17  15:00    


 


Antibody Screen  Negative   17  15:00    


 


Crossmatch  See Detail   17  15:00    


 


BBK History Checked  Patient has bt   17  15:00    














- Hospital Course


Hospital Course: 


18-year-old female  with an LMP of 2017; status post a vaginal 

delivery in May 2017 presented to ED with complaints of acute onset of left 

lower quadrant pain on 17.  TVUS done on 17 shows heterogeneous 

vascular 15.6 x10.6x 10 cm lesion at RLQ; pleural effusion; no evidence of 

intrauterine gestational sac, cannot rule out ectopic pregnancy or IUP. Beta 

HCG quant was 10.75 on 17.  Pt's continued to have lower abdominal pain 

and consented for for laparotomy, removal of ectopic pregnancy if present, 

removal of pelvic mass. Pt had laparatomy and L salpingo oophrectomy performed 

on 17. Post procedure b-HCG quant was 5.14. Pt's LFTs were mildly elevated 

and advised to f/u outpatient.  TVUS showed pleural effusion and repeat CXR  on 

17 was neg for PNA or significant effusion. Pt has scheduled appointment 

at [facility] w/ Dr. Brown on 10/5/17 at 3 pm and Dr Sawyer on 10/10/17 at 1:45 pm. 

Pt is going home with percocet 5/325 mg PO Q6h prn tablets #10 and ibuprofen 

600mg tab PO tid PRN #42, colace 100 mg PO BID #20 and ferrous sulfate PO TID #

90.





Discharge Exam





- Head Exam


Head Exam: ATRAUMATIC, NORMAL INSPECTION





- Eye Exam


Eye Exam: Normal appearance





- ENT Exam


ENT Exam: Mucous Membranes Moist





- Neck Exam


Neck exam: Normal Inspection





- Respiratory Exam


Respiratory Exam: Clear to PA & Lateral, NORMAL BREATHING PATTERN.  absent: 

Rales, Rhonchi, Wheezes, Respiratory Distress





- Cardiovascular Exam


Cardiovascular Exam: REGULAR RHYTHM, RRR, +S1, +S2





- GI/Abdominal Exam


GI & Abdominal Exam: Normal Bowel Sounds, Soft.  absent: Tenderness


Additional comments: 


surgical scar in lower abdomen. looks clean and dry. no erythema, swelling or 

discharge. no signs of infection. 





- Extremities Exam


Extremities exam: normal capillary refill, pedal pulses present





- Neurological Exam


Neurological exam: Alert, Oriented x3





- Psychiatric Exam


Psychiatric exam: Normal Affect, Normal Mood





Discharge Plan





- Discharge Medications


Prescriptions: 


Docusate [Colace] 100 mg PO BID #20 cap


Ferrous Sulfate [Feosol] 325 mg PO TID #90 tab


Ibuprofen [Motrin Tab] 600 mg PO TID #42 tab


oxyCODONE/Acetaminophen [Percocet 5/325 mg Tab] 1 tab PO Q6 PRN #10 tab


 PRN Reason: Pain, Severe (8-10)





- Follow Up Plan


Condition: FAIR


Disposition: HOME/ ROUTINE


Instructions:  Ectopic Pregnancy (DC), Threatened Miscarriage (ED), Uterine 

Fibroids (DC)


Additional Instructions: 


Follow up with Dr Sawyer on 10/10/17 at 1:45 pm


Follow up with primary care physician Dr. Arnaldo Herrera 10/5/17 at 3 pm.


Referrals: 


Bentley Molina MD [Staff Provider] -

## 2017-09-24 NOTE — CP.PCM.CON
History of Present Illness





- History of Present Illness


History of Present Illness: 





Pt denies any overnight events. Abdominal pain is controlled with PO 

medication. Patient states she had one eppisode of vomiting yesterday. But 

today is currently eating breakfast, and is tolerating it well.














Past Patient History





- Past Medical History & Family History


Past Medical History?: No





- Past Social History


Smoking Status: Never Smoked





- CARDIAC


Hx Cardiac Disorders: No





- PULMONARY


Hx Respiratory Disorders: No





- NEUROLOGICAL


Hx Neurological Disorder: No





- HEENT


Hx HEENT Problems: No





- RENAL


Hx Chronic Kidney Disease: No





- ENDOCRINE/METABOLIC


Hx Endocrine Disorders: No





- HEMATOLOGICAL/ONCOLOGICAL


Hx Blood Disorders: No


Hx AIDS: No


Hx Human Immunodeficiency Virus (HIV): No





- INTEGUMENTARY


Hx Dermatological Problems: No





- MUSCULOSKELETAL/RHEUMATOLOGICAL


Hx Musculoskeletal Disorders: No


Hx Falls: No





- GASTROINTESTINAL


Hx Gastrointestinal Disorders: No





- GENITOURINARY/GYNECOLOGICAL


Hx Reproductive Disorders: Yes (Uterine fibroids)





- PSYCHIATRIC


Hx Psychophysiologic Disorder: No


Hx Substance Use: No





- SURGICAL HISTORY


Hx Surgeries: No





- ANESTHESIA


Hx Anesthesia: No


Hx Anesthesia Reactions: No


Hx Malignant Hyperthermia: No


Has any member of the family had a problem w/ anesthesia?: No





Meds


Allergies/Adverse Reactions: 


 Allergies











Allergy/AdvReac Type Severity Reaction Status Date / Time


 


No Known Allergies Allergy   Verified 09/20/17 13:58














- Medications


Medications: 


 Current Medications





Docusate Sodium (Colace)  200 mg PO DAILY [facility]


   Last Admin: 09/24/17 08:58 Dose:  200 mg


Enoxaparin Sodium (Lovenox)  40 mg SC DAILY [facility]


   PRN Reason: Protocol


   Last Admin: 09/24/17 08:57 Dose:  40 mg


Ferrous Sulfate (Feosol)  325 mg PO BID [facility]


   Last Admin: 09/24/17 08:57 Dose:  325 mg


Guaifenesin (Mucinex La)  600 mg PO Q12 [facility]


   Last Admin: 09/24/17 08:57 Dose:  600 mg


Guaifenesin/Dextromethorphan (Robitussin Dm)  10 ml PO Q6 PRN


   PRN Reason: Cough


   Last Admin: 09/24/17 04:29 Dose:  10 ml


Ibuprofen (Motrin Tab)  600 mg PO Q6 PRN


   PRN Reason: Pain, Mild (1-3)


Oxycodone/Acetaminophen (Percocet 5/325 Mg Tab)  1 tab PO Q4 PRN


   PRN Reason: Pain, moderate (4-7)


   Stop: 09/26/17 09:11


   Last Admin: 09/24/17 03:28 Dose:  1 tab


Oxycodone/Acetaminophen (Percocet 5/325 Mg Tab)  2 tab PO Q4 PRN


   PRN Reason: Pain, moderate (4-7)


   Stop: 09/26/17 09:11











Physical Exam





- Constitutional


Appears: Well, No Acute Distress





- Head Exam


Head Exam: ATRAUMATIC, NORMAL INSPECTION





- Respiratory Exam


Respiratory Exam: Clear to Auscultation Bilateral, NORMAL BREATHING PATTERN





- Cardiovascular Exam


Cardiovascular Exam: REGULAR RHYTHM, +S1, +S2





- GI/Abdominal Exam


GI & Abdominal Exam: Normal Bowel Sounds, Soft, Tenderness (mild tenderness)


Additional comments: 





Abdominal incision wound : Staples noted, appears C/D/I





- Extremities Exam


Extremities exam: Positive for: normal inspection.  Negative for: calf 

tenderness





Results





- Vital Signs


Recent Vital Signs: 


 Last Vital Signs











Temp  98 F   09/24/17 08:13


 


Pulse  84   09/24/17 08:13


 


Resp  18   09/24/17 08:13


 


BP  100/68 L  09/24/17 08:13


 


Pulse Ox  96   09/24/17 08:13














- Labs


Result Diagrams: 


 09/24/17 05:45





 09/24/17 05:45


Labs: 


 Laboratory Results - last 24 hr











  09/24/17 09/24/17





  05:45 05:45


 


WBC  9.4 


 


RBC  4.04 


 


Hgb  9.4 L 


 


Hct  29.6 L 


 


MCV  73.1 L 


 


MCH  23.2 L 


 


MCHC  31.7 L 


 


RDW  19.3 H 


 


Plt Count  373 


 


Sodium   142


 


Potassium   3.5 L


 


Chloride   103


 


Carbon Dioxide   30


 


Anion Gap   13


 


BUN   10


 


Creatinine   0.6 L


 


Est GFR ( Amer)   > 60


 


Est GFR (Non-Af Amer)   > 60


 


Random Glucose   102


 


Calcium   8.7














Assessment & Plan





- Assessment and Plan (Free Text)


Assessment: 





1. S/P laparatomy and L Salpingo oopherectomy POD #3


- Patient doing well on PO meds: Pain is well controlled


- Continue with colase


- CBC s/p transfusion: 9.4/29.6


- Possible discharge today as per medicine team


- Will consider discharge patient on OCP 





2. Anemia:


- s/p 2 units of PRBC yestrday





3. DVT prophylaxis:


- Encouraged ambulation


- lovenox and scd while in hospital


Patient was seen and Discussed w/ Dr. Joyner

## 2017-12-05 NOTE — US
HISTORY:

RUQ pain



COMPARISON:

None.



TECHNIQUE:

Grayscale imaging was performed.



FINDINGS:



LIVER:

Measures 19.5 cm in length. Normal echogenicity of the liver 

parenchyma.  No mass. No intrahepatic bile duct dilatation. 



GALLBLADDER:

There are no gallstones, wall thickening or pericholecystic fluid. 

The sonographic Sánchez's sign is negative 



COMMON BILE DUCT:

Measures 7.0 mm.  No stones. No dilatation.



PANCREAS:

Unremarkable as visualized. No mass. No ductal dilatation.



RIGHT KIDNEY:

Measures 10.7 cm in length. Normal echogenicity. No calculus, mass, 

or hydronephrosis.



AORTA:

No aneurysmal dilatation.



IVC:

Unremarkable.



OTHER FINDINGS:

None .



IMPRESSION:

Mild hepatomegaly.



Mild diffuse dilatation of the common bile duct without 

choledocholithiasis or intraluminal abnormality.



A preliminary report was provided by [employer].

## 2017-12-05 NOTE — ED PDOC
HPI: Abdomen


Time Seen by Provider: 12/05/17 00:26


Chief Complaint (Nursing): Abdominal Pain


Chief Complaint (Provider): Abdominal pain


History Per: Patient


History/Exam Limitations: no limitations


Onset/Duration Of Symptoms: Hrs (3), Days (1)


Location Of Pain/Discomfort: RUQ, LUQ


Associated Symptoms: Vomiting.  denies: Fever, Diarrhea


Additional Complaint(s): 





Patient is an 19 y/o female with no significant past medical history presenting 

to the emergency department for epigastric and bilateral upper abdominal pain 

since yesterday and vomiting since three hours ago. Denies alcohol intake, 

consumption of fatty, greasy, or spicy food, and denies diarrhea, fever, or 

other complaints.





PCP: none provided.





Past Medical History


Reviewed: Historical Data, Nursing Documentation, Vital Signs


Vital Signs: 


 Last Vital Signs











Temp  98.3 F   12/04/17 23:36


 


Pulse  103   12/04/17 23:36


 


Resp  18   12/04/17 23:36


 


BP  119/64 L  12/04/17 23:36


 


Pulse Ox  100   12/05/17 04:26














- Medical History


PMH: 


   Denies: HIV, Chronic Kidney Disease





- Surgical History


Other surgeries: removal of uterine myoma





- Family History


Family History: States: Unknown Family Hx





- Home Medications


Home Medications: 


 Ambulatory Orders











 Medication  Instructions  Recorded


 


Prenatal Vit Calc,Iron,Folic 1 each PO DAILY 05/14/17





[Prenatal Vitamins]  


 


Docusate [Colace] 100 mg PO BID #20 cap 09/24/17


 


Ferrous Sulfate [Feosol] 325 mg PO TID #90 tab 09/24/17


 


Ibuprofen [Motrin Tab] 600 mg PO TID #42 tab 09/24/17


 


oxyCODONE/Acetaminophen [Percocet 1 tab PO Q6 PRN #10 tab 09/24/17





5/325 mg Tab]  


 


Ondansetron ODT [Zofran ODT] 4 mg PO Q8 PRN #12 odt 12/05/17














- Allergies


Allergies/Adverse Reactions: 


 Allergies











Allergy/AdvReac Type Severity Reaction Status Date / Time


 


No Known Allergies Allergy   Verified 12/04/17 23:38














Review of Systems


ROS Statement: Except As Marked, All Systems Reviewed And Found Negative


Constitutional: Negative for: Fever


Gastrointestinal: Positive for: Abdominal Pain (upper bilateral, epigastric).  

Negative for: Diarrhea





Physical Exam





- Reviewed


Nursing Documentation Reviewed: Yes


Vital Signs Reviewed: Yes





- Physical Exam


Appears: Positive for: Well, Non-toxic, No Acute Distress


Head Exam: Positive for: ATRAUMATIC, NORMAL INSPECTION, NORMOCEPHALIC


Skin: Positive for: Normal Color, Warm, Dry


Eye Exam: Positive for: Normal appearance


Neck: Positive for: Normal


Cardiovascular/Chest: Positive for: Regular Rate, Rhythm


Respiratory: Negative for: Accessory Muscle Use, Respiratory Distress


Gastrointestinal/Abdominal: Positive for: Tenderness (epigastric, right upper > 

left upper)


Back: Positive for: Normal Inspection.  Negative for: L CVA Tenderness, R CVA 

Tenderness, Vertebral Tenderness


Extremity: Positive for: Normal ROM


Neurologic/Psych: Positive for: Alert, Oriented (x3)





- Laboratory Results


Result Diagrams: 


 12/05/17 01:22





 12/05/17 01:22





- ECG


O2 Sat by Pulse Oximetry: 100 (RA)


Pulse Ox Interpretation: Normal





Medical Decision Making


Medical Decision Making: 





Time: 00:42





Initial impression:  Abdominal pain and vomiting





Differential diagnoses include but are not limited to gastritis, pancreatitis, 

acute cholecystitis, and biliary colic





Initial plan:


 Labs: CMP, Lipase, CBC


 ED Urine Dipstick


 ED Urine Pregnancy


 Morphine 4 mg IVP


 Normal Saline 1 L IV


 Zofran 4 mg IVP


 Gallbladder ultrasound


 Reevaluation





02:23


Abdominal ultrasound reviewed. Findings noted as follows:





FINDINGS:


Liver: There is hepatomegaly with the right lobe of the liver measuring 19.5 

cm. No intrahepatic bile


duct dilation.


Gallbladder: Unremarkable. No gallstones.


Common bile duct: Common bile duct is mildly dilated measuring 7 mm diameter 

with no visible


choledocholithiasis to the extent visualized.


Pancreas: The pancreas is obscured.


Right kidney: Unremarkable. No stones. No solid mass. No hydronephrosis.





IMPRESSION:


Common bile duct is mildly dilated measuring 7 mm diameter with no visible 

choledocholithiasis to


the extent visualized.





02:36


Abdominal/pelvic CT scan ordered.





03:20


Abdominal/pelvic CT scan reviewed. Findings noted as follows:





FINDINGS:


Lower thorax: There is minimal bibasilar atelectatic change or scarring.





ABDOMEN:


Liver: There is mild hepatomegaly.


Gallbladder and bile ducts: As seen on ultrasound, there is extrahepatic 

biliary dilation with the


common bile duct measuring up to 10 mm diameter but no visible 

choledocholithiasis or other


obstructing mass. No visible gallbladder calculi.


Pancreas: Unremarkable. No mass. No ductal dilation.


Spleen: Unremarkable. No splenomegaly.


Adrenals: Unremarkable. No mass.


Kidneys and ureters: Unremarkable. No solid mass. No hydronephrosis.


Stomach and bowel: Unremarkable. No obstruction. No mucosal thickening.


Appendix: The appendix is unremarkable and seen best on axial image 67 of 

series 2.





PELVIS:


Bladder: Unremarkable. No mass.


Reproductive: Unremarkable as visualized.





ABDOMEN and PELVIS:


Intraperitoneal space: There is trace free fluid in the pelvis, cannot exclude 

ovarian cyst rupture.


There is trace free fluid in Morison's pouch. No free air.


Bones/joints: No acute fracture. No dislocation.


Soft tissues: There is a small fat filled umbilical hernia without signs of 

incarceration


Vasculature: Unremarkable. No abdominal aortic aneurysm.


Lymph nodes: Unremarkable. No enlarged lymph nodes.





IMPRESSION:


1. As seen on ultrasound, there is extrahepatic biliary dilation with the 

common bile duct measuring


up to 10 mm diameter but no visible choledocholithiasis or other obstructing 

mass.


2. There is trace free fluid in the pelvis, cannot exclude ovarian cyst rupture.





04:18


Patient is stable for discharge. Zofran prescribed. Instructed patient to 

follow up with PCP in 2-3 day and to return to ED if symptoms worsen.





Clinical impression: abdominal pain





Scribe Attestation:


Documented by Yadira Ruvalcaba, acting as a scribe for Balta Leal MD.





Provider Scribe Attestation:


All medical record entries made by the Scribe were at my direction and 

personally dictated by me. I have reviewed the chart and agree that the record 

accurately reflects my personal performance of the history, physical exam, 

medical decision making, and the department course for this patient. I have 

also personally directed, reviewed, and agree with the discharge instructions 

and disposition.





Disposition





- Clinical Impression


Clinical Impression: 


 Abdominal pain in female








- Patient ED Disposition


Is Patient to be Admitted: No


Doctor Will See Patient In The: Office


Counseled Patient/Family Regarding: Studies Performed, Diagnosis, Need For 

Followup





- Disposition


Referrals: 


[facility] [Outside]


Disposition: Routine/Home


Disposition Time: 04:18


Condition: GOOD


Additional Instructions: 


Return for worsening. Follow up with your PCP In 2-3 days. 


Prescriptions: 


Ondansetron ODT [Zofran ODT] 4 mg PO Q8 PRN #12 odt


 PRN Reason: Nausea/Vomiting


Instructions:  Abdominal Pain (ED)

## 2017-12-05 NOTE — CT
PROCEDURE:  CT Abdomen and Pelvis with contrast



HISTORY:

ruq epigastric pain



COMPARISON:

Limited abdominal ultrasound performed 12/5/17



TECHNIQUE:

Contrast dose: 95 mL Omnipaque IV



Radiation dose:



Total exam DLP = 1103.85 mGy-cm.



This CT exam was performed using one or more of the following dose 

reduction techniques: Automated exposure control, adjustment of the 

mA and/or kV according to patient size, and/or use of iterative 

reconstruction technique.



FINDINGS:



LOWER THORAX:

No visible consolidation, pleural effusion, or pneumothorax.



LIVER:

Mild hepatomegaly. 



GALLBLADDER AND BILE DUCTS:

Gallbladder distension. No calcified gallstones evident. Gallbladder 

wall thickening/pericholecystic edema.  The common bile duct is 

dilated measuring approximately 10 mm. 



PANCREAS:

Unremarkable.



SPLEEN:

Unremarkable. 



ADRENALS:

Unremarkable. 



KIDNEYS AND URETERS:

The kidneys enhance symmetrically. No hydronephrosis or obstructing 

calculus identified. 



VASCULATURE:

No aortic aneurysm. 



BOWEL:

Stomach is nondistended.  



Lack of oral contrast limits evaluation for bowel pathology.  Bowel 

loops appear within normal limits of caliber without evidence of 

obstruction.



APPENDIX:

The appendix appears within normal limits of caliber. No secondary 

signs of acute appendicitis.



PERITONEUM:

Small pelvic free fluid.  No definite free air. 



LYMPH NODES:

No bulky adenopathy identified. 



BLADDER:

Unremarkable. 



REPRODUCTIVE:

Uterus is present. 



BONES:

No acute osseous abnormality is detected. 



OTHER FINDINGS:

Small fat containing umbilical hernia.



IMPRESSION:

Gallbladder distension. No calcified gallstones evident. Gallbladder 

wall thickening/pericholecystic edema. The common bile duct is 

dilated measuring approximately 10 mm. Correlate clinically. 



Hepatomegaly. 



Small pelvic free fluid. 



Preliminary impression was provided by virtual radiologic.

## 2017-12-09 NOTE — CP.PCM.HP
History of Present Illness





- History of Present Illness


History of Present Illness: 


CC: Abd pain





HPI: This is an 17 y/o female with no longstanding medical conditions who 

presents to the ER, now for the second time, with abd pain. She states she has 

been having intermittent R sided abd pain with radiation to the back 

occasionally for past 5 days. Pain is accompanied by nb/nb vomiting. No f/c. 

Pain did not correspond to eating/drinking anything. Nothing made pain better 

or worse. She has never had pain like this prior to this week.





ROS: 14 systems reviewed, negative other than HPI





MHx: None





SHx: States some kind of abdominal surgery in the past





Allergies: NKDA





Medications: None





Family Hx: Reviewed, no relevant findings





Social Hx: Lives with family, no tobacco, no EtOH





Present on Admission





- Present on Admission


Any Indicators Present on Admission: No





Past Patient History





- Past Medical History & Family History


Past Medical History?: No





- Past Social History


Smoking Status: Never Smoked





- CARDIAC


Hx Cardiac Disorders: No





- PULMONARY


Hx Respiratory Disorders: No





- NEUROLOGICAL


Hx Neurological Disorder: No





- HEENT


Hx HEENT Problems: No





- RENAL


Hx Chronic Kidney Disease: No





- ENDOCRINE/METABOLIC


Hx Endocrine Disorders: No





- HEMATOLOGICAL/ONCOLOGICAL


Hx Human Immunodeficiency Virus (HIV): No





- INTEGUMENTARY


Hx Dermatological Problems: No





- MUSCULOSKELETAL/RHEUMATOLOGICAL


Hx Musculoskeletal Disorders: No


Hx Falls: No





- GASTROINTESTINAL


Hx Gastrointestinal Disorders: No





- GENITOURINARY/GYNECOLOGICAL


Hx Reproductive Disorders: Yes (Uterine fibroids)





- PSYCHIATRIC


Hx Psychophysiologic Disorder: No


Hx Substance Use: No





- SURGICAL HISTORY


Hx Surgeries: Yes


Other/Comment: fibroid removal





- ANESTHESIA


Hx Anesthesia: Yes


Hx Anesthesia Reactions: No


Hx Malignant Hyperthermia: No





Meds


Allergies/Adverse Reactions: 


 Allergies











Allergy/AdvReac Type Severity Reaction Status Date / Time


 


No Known Allergies Allergy   Verified 12/08/17 22:59














Physical Exam





- Constitutional


Appears: No Acute Distress





- Head Exam


Head Exam: ATRAUMATIC, NORMOCEPHALIC





- Eye Exam


Eye Exam: EOMI, PERRL





- ENT Exam


ENT Exam: Mucous Membranes Moist





- Neck Exam


Neck exam: Positive for: Full Rom





- Respiratory Exam


Respiratory Exam: Clear to Auscultation Bilateral, NORMAL BREATHING PATTERN





- Cardiovascular Exam


Cardiovascular Exam: REGULAR RHYTHM, +S1, +S2





- GI/Abdominal Exam


GI & Abdominal Exam: Normal Bowel Sounds, Soft, Tenderness





- Extremities Exam


Extremities exam: Positive for: full ROM





- Neurological Exam


Neurological exam: Alert, CN II-XII Intact, Oriented x3





- Psychiatric Exam


Psychiatric exam: Normal Affect, Normal Mood





- Skin


Skin Exam: Dry, Warm





Results





- Vital Signs


Recent Vital Signs: 





 Last Vital Signs











Temp  98.1 F   12/08/17 23:00


 


Pulse  88   12/08/17 23:00


 


Resp  16   12/08/17 23:00


 


BP  134/71   12/08/17 23:00


 


Pulse Ox  99   12/09/17 05:16














- Labs


Result Diagrams: 


 12/08/17 23:59





 12/08/17 23:59


Labs: 





 Laboratory Results - last 24 hr











  12/08/17 12/08/17 12/09/17





  23:59 23:59 05:10


 


WBC  10.1  


 


RBC  4.51  


 


Hgb  10.1 L  


 


Hct  33.3 L  


 


MCV  73.8 L  


 


MCH  22.5 L  


 


MCHC  30.5 L  


 


RDW  17.7 H  


 


Plt Count  508 H  


 


MPV  8.5  


 


Neut % (Auto)  79.8 H  


 


Lymph % (Auto)  12.8 L  


 


Mono % (Auto)  3.8  


 


Eos % (Auto)  2.9  


 


Baso % (Auto)  0.7  


 


Neut #  8.1 H  


 


Lymph #  1.3  


 


Mono #  0.4  


 


Eos #  0.3  


 


Baso #  0.1  


 


Sodium   144 


 


Potassium   3.6 


 


Chloride   106 


 


Carbon Dioxide   25 


 


Anion Gap   17 


 


BUN   18 H 


 


Creatinine   0.6 L 


 


Est GFR ( Amer)   > 60 


 


Est GFR (Non-Af Amer)   > 60 


 


Random Glucose   103 


 


Lactic Acid    0.7


 


Calcium   9.1 


 


Total Bilirubin   0.2 


 


AST   22 


 


ALT   56 H D 


 


Alkaline Phosphatase   108 


 


Total Protein   7.7 


 


Albumin   4.0 


 


Globulin   3.7 


 


Albumin/Globulin Ratio   1.1 


 


Lipase   63 














- Imaging and Cardiology


  ** CT scan - abdomen


Status: Report reviewed by me (pre merida report indicates acalculus cholecystitis)





Assessment & Plan


(1) Acute acalculous cholecystitis


Assessment and Plan: 


17 y/o female with what appears to be acalculous cholecystitis.


-Admit to med surg


-NPO, IVF


-Pain, nausea control


-Zosyn IV


-GI consult; Surgery consult if necessary


-SCDs for DVT PPx


Status: Acute   





(2) DVT prophylaxis


Status: Acute

## 2017-12-09 NOTE — ED PDOC
HPI: Abdomen


Time Seen by Provider: 17 23:14


Chief Complaint (Nursing): Abdominal Pain


Chief Complaint (Provider): Abdominal pain, intermittent, RUQ


History Per: Patient


History/Exam Limitations: no limitations


Onset/Duration Of Symptoms: Days


Outside of [place] travel?: No


Current Symptoms Are (Timing): Intermittent Episodes


Quality Of Discomfort: Sharp


Associated Symptoms: Nausea.  denies: Fever, Chills, Vomiting, Loss Of Appetite

, Urinary Symptoms


Exacerbating Factors: None


Alleviating Factors: None


Additional Complaint(s): 





Pt was seen in ER monday for the same. Pt states this evening the pain was 

worse so she decided to return. Pt has not made appointment with PMD or GI. Pt 

stats the pain is now better than earlier. Pt did not take medications for 

pain. 





Past Medical History


Reviewed: Historical Data, Nursing Documentation, Vital Signs


Vital Signs: 


 Last Vital Signs











Temp  98.1 F   17 23:00


 


Pulse  88   17 23:00


 


Resp  16   17 23:00


 


BP  134/71   17 23:00


 


Pulse Ox  99   17 01:55














- Medical History


PMH: No Chronic Diseases


   Denies: HIV, Chronic Kidney Disease





- Family History


Family History: States: Unknown Family Hx





- Living Arrangements


Living Arrangements: With Family





- Social History


Current smoker - smoking cessation education provided: No





- Home Medications


Home Medications: 


 Ambulatory Orders











 Medication  Instructions  Recorded


 


No Known Home Med  17














- Allergies


Allergies/Adverse Reactions: 


 Allergies











Allergy/AdvReac Type Severity Reaction Status Date / Time


 


No Known Allergies Allergy   Verified 17 22:59














Review of Systems


ROS Statement: Except As Marked, All Systems Reviewed And Found Negative


Constitutional: Negative for: Fever, Chills


Gastrointestinal: Positive for: Nausea, Abdominal Pain


Genitourinary Female: Negative for: Dysuria





Physical Exam





- Reviewed


Nursing Documentation Reviewed: Yes


Vital Signs Reviewed: Yes





- Physical Exam


Appears: Positive for: Well, Non-toxic, No Acute Distress


Head Exam: Positive for: ATRAUMATIC, NORMAL INSPECTION, NORMOCEPHALIC


Skin: Positive for: Normal Color, Warm, DRY


Eye Exam: Positive for: Normal appearance


ENT: Positive for: Normal ENT Inspection


Neck: Positive for: Normal, Painless ROM


Cardiovascular/Chest: Positive for: Regular Rate, Rhythm


Respiratory: Positive for: CNT, Normal Breath Sounds


Gastrointestinal/Abdominal: Positive for: Normal Exam, Bowel Sounds, Soft.  

Negative for: Tenderness


Back: Positive for: Normal Inspection


Extremity: Positive for: Normal ROM


Neurologic/Psych: Positive for: Alert, Oriented





- Laboratory Results


Result Diagrams: 


 17 23:59





 17 23:59





- ECG


O2 Sat by Pulse Oximetry: 99





Medical Decision Making


Medical Decision Makinnd visit to the ER for evaluation of abdominal pain. CT showing thicken GB 

wall and surrounding fluid. ERCP recommended. Case discussed with DR. Moore. 

Dr. Moore spoke to Dr. Kimbrough for admission. 





Disposition





- Clinical Impression


Clinical Impression: 


 Abdominal pain








- Patient ED Disposition


Is Patient to be Admitted: No





- Disposition


Disposition Time: 05:16


Condition: STABLE





- Pt Status Changed To:


Hospital Disposition Of: Inpatient





- Admit Certification


Admit to Inpatient:: After my assessment, the patient will require 

hospitalization for at least two midnights.  This is because of the severity of 

symptoms shown, intensity of services needed, and/or the medical risk in this 

patient being treated as an outpatient.





- POA


Present On Arrival: None

## 2017-12-09 NOTE — CT
PROCEDURE:  CT Abdomen and Pelvis without intravenous contrast



HISTORY:

RUQ pain



COMPARISON:

None.



TECHNIQUE:

Technique. 



Contrast Dose: 95 cc of Omnipaque 300



Radiation dose:



Total exam DLP = 1124 mGy-cm.



This CT exam was performed using one or more of the following dose 

reduction techniques: Automated exposure control, adjustment of the 

mA and/or kV according to patient size, and/or use of iterative 

reconstruction technique.



FINDINGS:



LOWER THORAX:

Unremarkable. 



LIVER:

Unremarkable. No gross lesion or ductal dilatation.  



GALLBLADDER AND BILE DUCTS:

Prominent extrahepatic common bile duct. 



PANCREAS:

Unremarkable. No gross lesion or ductal dilatation.



SPLEEN:

Unremarkable. 



ADRENALS:

Unremarkable. No mass. 



KIDNEYS AND URETERS:

Unremarkable. No hydronephrosis. No solid mass. 



VASCULATURE:

Unremarkable. No aortic aneurysm. 



BOWEL:

Unremarkable. No obstruction. No gross mural thickening. 



APPENDIX:

Unremarkable. Normal appendix. 



PERITONEUM:

Unremarkable. No free fluid. No free air. 



LYMPH NODES:

Unremarkable. No enlarged lymph nodes. 



BLADDER:

Unremarkable. 



REPRODUCTIVE:

Unremarkable. 



BONES:

No acute fracture. 



OTHER FINDINGS:

None.



IMPRESSION:

Prominent extrahepatic common bile duct. No evidence of pancreatic 

mass or calculus.

## 2017-12-10 NOTE — CP.PCM.PN
Subjective





- Date & Time of Evaluation


Date of Evaluation: 12/10/17


Time of Evaluation: 11:30





- Subjective


Subjective: 





No fever


abd pain better


soft stool x 3 yesterday  and once this am


no CP


no SOB


no N/V








Objective





- Vital Signs/Intake and Output


Vital Signs (last 24 hours): 


 











Temp Pulse Resp BP Pulse Ox


 


 98 F   65   20   113/81   97 


 


 12/10/17 08:28  12/10/17 08:28  12/10/17 08:28  12/10/17 08:28  12/10/17 08:28











- Medications


Medications: 


 Current Medications





Hydromorphone HCl (Dilaudid)  0.5 mg IVP Q6 PRN


   PRN Reason: Pain, Mild (1-3)


Hydromorphone HCl (Dilaudid)  1 mg IVP Q6 PRN


   PRN Reason: Pain, moderate (4-7)


Piperacillin Sod/Tazobactam (Sod 3.375 gm/ Sodium Chloride)  100 mls @ 100 mls/

hr IVPB Q6 FAUZIA


   PRN Reason: Protocol


   Last Admin: 12/10/17 09:37 Dose:  100 mls/hr


Ondansetron HCl (Zofran Inj)  4 mg IVP Q6 PRN


   PRN Reason: Nausea/Vomiting











- Labs


Labs: 


 





 12/08/17 23:59 





 12/10/17 08:03 











- Constitutional


Appears: No Acute Distress





- Head Exam


Head Exam: NORMAL INSPECTION, NORMOCEPHALIC





- Eye Exam


Eye Exam: EOMI, Normal appearance, PERRL


Pupil Exam: NORMAL ACCOMODATION





- ENT Exam


ENT Exam: Mucous Membranes Moist, Normal External Ear Exam





- Neck Exam


Neck Exam: Full ROM.  absent: Meningismus





- Respiratory Exam


Respiratory Exam: NORMAL BREATHING PATTERN.  absent: Rales, Wheezes, 

Respiratory Distress





- Cardiovascular Exam


Cardiovascular Exam: REGULAR RHYTHM, +S1, +S2





- GI/Abdominal Exam


GI & Abdominal Exam: Soft, Tenderness (mild RUQ tenderness), Normal Bowel Sounds





- Extremities Exam


Extremities Exam: Full ROM, Normal Capillary Refill.  absent: Calf Tenderness





- Back Exam


Back Exam: Full ROM.  absent: CVA tenderness (L), CVA tenderness (R)





- Neurological Exam


Neurological Exam: Alert, Awake, CN II-XII Intact, Oriented x3


Neuro motor strength exam: Left Upper Extremity: 5, Right Upper Extremity: 5, 

Left Lower Extremity: 5, Right Lower Extremity: 5





- Psychiatric Exam


Psychiatric exam: Normal Affect, Normal Mood





- Skin


Skin Exam: Dry, Normal Color, Warm





Assessment and Plan





- Assessment and Plan (Free Text)


Assessment: 








17 y/o lady with no significant PMH , came in bec of abdominal pain more on the 

RUQ x 1 wk, worse after eating.





CT of Abdomen:


Prominent extrahepatic common bile duct. No evidence of pancreatic mass or 

calculus.





(1)RUQ Pain ?  Biliary Colic 


NPO


IVF hydration


GI consult - Dr Lj godoy HIDA scan 


cont IV Zosyn


Pain mgt


soft stool x 3 yesterday


Abd pain better today - Dr Calhoun rec to strt diet





(2) DVT prophylaxis


Status: Acute 


SCD

## 2017-12-11 NOTE — CP.PCM.PN
Subjective





- Date & Time of Evaluation


Date of Evaluation: 12/11/17


Time of Evaluation: 11:00





- Subjective


Subjective: 





No fever


abd pain better however she again had pain after she ate last night


no N/V


no CP


no SOB








Objective





- Vital Signs/Intake and Output


Vital Signs (last 24 hours): 


 











Temp Pulse Resp BP Pulse Ox


 


 98.1 F   70   20   106/73 L  96 


 


 12/11/17 15:58  12/11/17 15:58  12/11/17 15:58  12/11/17 15:58  12/11/17 15:58











- Medications


Medications: 


 Current Medications





Hydromorphone HCl (Dilaudid)  0.5 mg IVP Q6 PRN


   PRN Reason: Pain, Mild (1-3)


Hydromorphone HCl (Dilaudid)  1 mg IVP Q6 PRN


   PRN Reason: Pain, moderate (4-7)


   Last Admin: 12/10/17 22:59 Dose:  1 mg


Piperacillin Sod/Tazobactam (Sod 3.375 gm/ Sodium Chloride)  100 mls @ 100 mls/

hr IVPB Q6 FAUZIA


   PRN Reason: Protocol


   Last Admin: 12/11/17 04:08 Dose:  100 mls/hr


Ondansetron HCl (Zofran Inj)  4 mg IVP Q6 PRN


   PRN Reason: Nausea/Vomiting











- Labs


Labs: 


 





 12/08/17 23:59 





 12/11/17 05:20 





 











PT  13.1 Seconds (9.8-13.1)   12/11/17  05:20    


 


INR  1.2  (0.9-1.2)   12/11/17  05:20    


 


APTT  42.1 Seconds (25.6-37.1)  H  12/11/17  05:20    











- Constitutional


Appears: No Acute Distress





- Head Exam


Head Exam: NORMAL INSPECTION, NORMOCEPHALIC





- Eye Exam


Eye Exam: EOMI, Normal appearance, PERRL


Pupil Exam: NORMAL ACCOMODATION





- ENT Exam


ENT Exam: Mucous Membranes Moist, Normal External Ear Exam





- Neck Exam


Neck Exam: Full ROM.  absent: Meningismus





- Respiratory Exam


Respiratory Exam: NORMAL BREATHING PATTERN.  absent: Rales, Wheezes, 

Respiratory Distress





- Cardiovascular Exam


Cardiovascular Exam: REGULAR RHYTHM, +S1, +S2





- GI/Abdominal Exam


GI & Abdominal Exam: Soft, Tenderness (mild RUQ tenderness), Normal Bowel Sounds





- Extremities Exam


Extremities Exam: Full ROM, Normal Capillary Refill.  absent: Calf Tenderness





- Back Exam


Back Exam: Full ROM.  absent: CVA tenderness (L), CVA tenderness (R)





- Neurological Exam


Neurological Exam: Alert, Awake, CN II-XII Intact, Oriented x3


Neuro motor strength exam: Left Upper Extremity: 5, Right Upper Extremity: 5, 

Left Lower Extremity: 5, Right Lower Extremity: 5





- Psychiatric Exam


Psychiatric exam: Normal Affect, Normal Mood





- Skin


Skin Exam: Dry, Normal Color, War





Assessment and Plan





- Assessment and Plan (Free Text)


Assessment: 





17 y/o lady with no significant PMH , came in bec of abdominal pain more on the 

RUQ x 1 wk, worse after eating.





CT of Abdomen:


Prominent extrahepatic common bile duct. No evidence of pancreatic mass or 

calculus.





(1)RUQ Pain ?  Biliary Colic 





IVF hydration


GI consult - Dr Lj godoy HIDA scan 


cont IV Zosyn


Pain mgt


soft stool x 3 yesterday


Abd pain better  - Dr Lj godoy to start diet


Surgery consult if HIDA scan is abn





(2) DVT prophylaxis


Status: Acute 


SCD

## 2017-12-11 NOTE — NM
PROCEDURE:  Nuclear Medicine Hepatobiliary Scan



HISTORY:

epigastric pain



COMPARISON:

Abdomen pelvis CT examination 12/09/2017. 



TECHNIQUE:

 mCi of technetium 99m Mebrofenin was administered intravenously. 

Planar images of the abdomen were obtained at 5 min intervals to 60 

mins. Delayed images were also obtained.



FINDINGS:

LIVER: Timely and homogenous uptake.



COMMON BILE DUCT: identified at 10 minutes post isotope 

administration. 



GALLBLADDER: identified at 30 mins.



SMALL BOWEL: Identified at 30 mins.



IMPRESSION:

Normal Hepatobiliary Scan. The cystic and common bile ducts are 

patent.

## 2017-12-11 NOTE — CP.PCM.DIS
Provider





- Provider


Date of Admission: 


12/09/17 04:40





Attending physician: 


Twila Kimbrough MD





Consults: 





GI : Dr Calhoun


Time Spent in preparation of Discharge (in minutes): 30





Diagnosis





- Discharge Diagnosis


(1) RUQ abdominal pain


Status: Acute   





(2) Acute acalculous cholecystitis


Status: Ruled-out   


Comment: ruled out   





Hospital Course





- Lab Results


Lab Results: 


 Micro Results





12/09/17 05:13   Blood-Venous   Blood Culture - Preliminary


                            NO GROWTH AFTER 48 HOURS


12/09/17 05:10   Blood-Venous   Blood Culture - Preliminary


                            NO GROWTH AFTER 48 HOURS





 Most Recent Lab Values











WBC  10.1 K/uL (4.8-10.8)   12/08/17  23:59    


 


RBC  4.51 Mil/uL (3.80-5.20)   12/08/17  23:59    


 


Hgb  10.1 g/dL (12.0-16.0)  L  12/08/17  23:59    


 


Hct  33.3 % (34.0-47.0)  L  12/08/17  23:59    


 


MCV  73.8 fl (81.0-99.0)  L  12/08/17  23:59    


 


MCH  22.5 pg (27.0-31.0)  L  12/08/17  23:59    


 


MCHC  30.5 g/dL (33.0-37.0)  L  12/08/17  23:59    


 


RDW  17.7 % (11.5-14.5)  H  12/08/17  23:59    


 


Plt Count  508 K/uL (130-400)  H  12/08/17  23:59    


 


MPV  8.5 fl (7.2-11.7)   12/08/17  23:59    


 


Neut % (Auto)  79.8 % (50.0-75.0)  H  12/08/17  23:59    


 


Lymph % (Auto)  12.8 % (20.0-40.0)  L  12/08/17  23:59    


 


Mono % (Auto)  3.8 % (0.0-10.0)   12/08/17  23:59    


 


Eos % (Auto)  2.9 % (0.0-4.0)   12/08/17  23:59    


 


Baso % (Auto)  0.7 % (0.0-2.0)   12/08/17  23:59    


 


Neut #  8.1 K/uL (1.8-7.0)  H  12/08/17  23:59    


 


Lymph #  1.3 K/uL (1.0-4.3)   12/08/17  23:59    


 


Mono #  0.4 K/uL (0.0-0.8)   12/08/17  23:59    


 


Eos #  0.3 K/uL (0.0-0.7)   12/08/17  23:59    


 


Baso #  0.1 K/uL (0.0-0.2)   12/08/17  23:59    


 


PT  13.1 Seconds (9.8-13.1)   12/11/17  05:20    


 


INR  1.2  (0.9-1.2)   12/11/17  05:20    


 


APTT  42.1 Seconds (25.6-37.1)  H  12/11/17  05:20    


 


Sodium  142 mmol/l (132-148)   12/11/17  05:20    


 


Potassium  4.2 MMOL/L (3.6-5.0)   12/11/17  05:20    


 


Chloride  108 mmol/L ()  H  12/11/17  05:20    


 


Carbon Dioxide  25 mmol/L (22-30)   12/11/17  05:20    


 


Anion Gap  13  (10-20)   12/11/17  05:20    


 


BUN  15 mg/dl (7-17)   12/11/17  05:20    


 


Creatinine  0.8 mg/dl (0.7-1.2)   12/11/17  05:20    


 


Est GFR ( Amer)  > 60   12/11/17  05:20    


 


Est GFR (Non-Af Amer)  > 60   12/11/17  05:20    


 


Random Glucose  98 mg/dL ()   12/11/17  05:20    


 


Lactic Acid  0.7 MMOL/L (0.7-2.1)   12/09/17  05:10    


 


Calcium  8.7 mg/dL (8.4-10.2)   12/11/17  05:20    


 


Total Bilirubin  0.2 mg/dl (0.2-1.3)   12/11/17  05:20    


 


AST  19 U/L (14-36)   12/11/17  05:20    


 


ALT  41 U/L (9-52)   12/11/17  05:20    


 


Alkaline Phosphatase  103 U/L ()   12/11/17  05:20    


 


Total Protein  6.8 G/DL (6.3-8.2)   12/11/17  05:20    


 


Albumin  3.4 g/dL (3.5-5.0)  L  12/11/17  05:20    


 


Globulin  3.4 gm/dL (2.2-3.9)   12/11/17  05:20    


 


Albumin/Globulin Ratio  1.0  (1.0-2.1)   12/11/17  05:20    


 


Lipase  63 U/L ()   12/08/17  23:59    


 


Urine Color  Yellow  (YELLOW)   12/11/17  12:11    


 


Urine Clarity  Cloudy  (Clear)   12/11/17  12:11    


 


Urine pH  5.0  (5.0-8.0)   12/11/17  12:11    


 


Ur Specific Gravity  1.017  (1.003-1.030)   12/11/17  12:11    


 


Urine Protein  Negative mg/dL (NEGATIVE)   12/11/17  12:11    


 


Urine Glucose (UA)  Neg mg/dL (Normal)   12/11/17  12:11    


 


Urine Ketones  Negative mg/dL (NEGATIVE)   12/11/17  12:11    


 


Urine Blood  Large  (NEGATIVE)   12/11/17  12:11    


 


Urine Nitrate  Negative  (NEGATIVE)   12/11/17  12:11    


 


Urine Bilirubin  Negative  (NEGATIVE)   12/11/17  12:11    


 


Urine Urobilinogen  0.2-1.0 mg/dL (0.2-1.0)   12/11/17  12:11    


 


Ur Leukocyte Esterase  Small Matt/uL (Negative)   12/11/17  12:11    


 


Urine RBC (Auto)  97 /hpf (0-3)  H  12/11/17  12:11    


 


Urine Microscopic WBC  8 /hpf (0-5)  H  12/11/17  12:11    


 


Ur Squamous Epith Cells  18 /hpf (0-5)  H  12/11/17  12:11    


 


Urine Bacteria  Rare  (<OCC)   12/11/17  12:11    














- Hospital Course


Hospital Course: 





17 y/o lady with no significant PMH , came in bec of abdominal pain more on the 

RUQ x 1 wk, worse after eating.


CT of Abdomen:


Prominent extrahepatic common bile duct. No evidence of pancreatic mass or 

calculus.


GI consulted- recommended HIDA scan.  HIDA scan : normal.  Pt tolerated PO diet.








(1)RUQ Pain , Acute Cholecystitis ruled out





IVF hydration


GI consult - Dr Lj godoy HIDA scan 


HIDA scan : normal


Received IV  Zosyn


Pain mgt


Abd pain better  - Dr Lj godoy to start diet and d/c pt








(2) DVT prophylaxis


Status: Acute 


SCD  





Discharge Exam





- Head Exam


Head Exam: NORMAL INSPECTION, NORMOCEPHALIC





- Eye Exam


Eye Exam: EOMI, Normal appearance, PERRL


Pupil Exam: NORMAL ACCOMODATION





- ENT Exam


ENT Exam: Mucous Membranes Moist, Normal External Ear Exam





- Neck Exam


Neck exam: Full Rom





- Respiratory Exam


Respiratory Exam: NORMAL BREATHING PATTERN.  absent: Respiratory Distress





- Cardiovascular Exam


Cardiovascular Exam: REGULAR RHYTHM, +S1, +S2





- GI/Abdominal Exam


GI & Abdominal Exam: Normal Bowel Sounds, Soft.  absent: Tenderness





- Extremities Exam


Extremities exam: full ROM, normal capillary refill, pedal pulses present





- Back Exam


Back exam: FULL ROM.  absent: CVA tenderness (L), CVA tenderness (R), vertebral 

tenderness





- Neurological Exam


Neurological exam: Alert, CN II-XII Intact, Normal Gait, Oriented x3, Reflexes 

Normal





- Psychiatric Exam


Psychiatric exam: Normal Affect, Normal Mood





- Skin


Skin Exam: Dry, Normal Color, Warm





Discharge Plan





- Follow Up Plan


Condition: GOOD


Disposition: HOME/ ROUTINE


Instructions:  Gallbladder Ejection Fraction (GEN)


Additional Instructions: 


ff up [facility] in 1 wk


Further GI work up as outpt

## 2017-12-12 NOTE — CON
DATE:  12/10/2017



REFERRING PHYSICIANS:  Dr. Martinez and Dr. Philip Domingo.



REASON FOR CONSULTATION:  Abdominal pain.



HISTORY OF PRESENT ILLNESS:  This is a pleasant 18-year-old female who

presents today with no past medical history, comes in with right upper

quadrant discomfort on and off for the past 5 days, has had in the past

before.  Currently has no pain, no nausea, no vomiting, no fevers, no

chills, no diarrhea, no sick contacts.  Lying in bed comfortably, in no

apparent distress.



PAST MEDICAL HISTORY:  As above.



PAST SURGICAL HISTORY:  As above.



MEDICATIONS:  Have been reviewed.



REVIEW OF SYSTEMS:  All other systems have been reviewed and negative apart

from the HPI.



PHYSICAL EXAMINATION:

VITAL SIGNS:  During the hospital grossly unremarkable.

GENERAL:  A pleasant young female lying in bed comfortably, in no apparent

distress.

HEENT:  Head:  Normocephalic, atraumatic.  Eyes:  Pupils equally reactive

to light bilaterally.  No conjunctival pallor or icterus.

NECK:  Supple.  Normal range of motion.  No lymphadenopathy appreciated.

LUNGS:  Coarse breath sounds bilaterally.

HEART:  S1, S2, regular rate and rhythm.  No murmurs appreciated.

ABDOMEN:  Soft, nontender.  Bowel sounds present.  No rebound.  No

guarding.

RECTAL:  Deferred.

EXTREMITIES:  Pulses present bilaterally.

SKIN:  Warm, dry, intact.

NEUROLOGIC:  A and O x3.



LABORATORY DATA:  Reviewed.  WBC is 10.1, hemoglobin 10.1, hematocrit 33.3.

LFTs are essentially normal.  ALT is 66.  Abdominal ultrasound and CT shows

no biliary findings.



ASSESSMENT AND PLAN:  This is an 18-year-old female with abdominal pain and

discomfort, unclear etiology.  This may be coming from cholecystitis versus

biliary colic.  From gastrointestinal standpoint, advance diet as

tolerated, consider HIDA scan if needed.  If negative, she can go home.  We

will follow up with the patient in the outpatient setting for a possible

endoscopy.



Thank you for the consult.





__________________________________________

Erickson Calhoun MD/ PhD



cc:  Dr. Philip Martinez



DD:  12/12/2017 11:24:21

DT:  12/12/2017 18:23:19

Job # [id number]

## 2018-03-01 NOTE — US
HISTORY:

ruq/ r/o cholecystitis



COMPARISON:

None.



TECHNIQUE:

Sonographic evaluation of the right upper quadrant of the abdomen.



FINDINGS:



LIVER:

Measures 18.0 cm in length. Normal echogenicity of the liver 

parenchyma.  No mass.  There is intrahepatic biliary dilatation 

noted. 



GALLBLADDER:

Unremarkable. No gallstones. 



COMMON BILE DUCT:

Measures 18 mm. No sonographically demonstrated choledocholithiasis. .



PANCREAS:

Poorly visualized.



RIGHT KIDNEY:

Measures 11.2 cm in length. Normal echogenicity. No calculus, mass, 

or hydronephrosis.



AORTA:

No aneurysmal dilatation.



IVC:

Unremarkable.



OTHER FINDINGS:

None .



IMPRESSION:

Intrahepatic biliary ductal dilatation.  Dilated common bile duct. 

Pancreas poorly visualized. Differential diagnosis includes 

choledocholithiasis, biliary stricture of the, ampullary mass or 

pancreatic mass.

## 2018-03-01 NOTE — CP.CCUPN
CCU Subjective





- Physician Review


Subjective (Free Text): 


19F admitted this morning for abd pain x 1 day, radiation with back pan and 

assoc with nausea /vomiting.  PMH: + Acalc Ashley Dec 2017 at [facility], and Morbid 

obesity. Presently awake and alert, has mild abd discomfort now after Morphine 

administration. Denies any CP, SOB, dizziness, palpitations, diaphoresis, chills

;  but has developed fevers to 102.3F now. Medical and surgical; team has 

started IVFs with LR at 300ml/hr. Except for mild tachycardia, BP levels have 

been acceptable. 





Other VS and I/Os reviewed. 





ROS:  No other pertinent negs or positives on 10+  system review. 





PMSFH:    All other Nursing and physician documentation reviewed to date; no 

new pertinent info noted relevant to current medical problems.





CXR: None


EKG: None


US Abdomen: no gall stones, CBD 18 mm, pancreas not  well seen. 





EXAM-


HEENT: no icterus


NECK: short neck, obese anatomy, unable to visualize any JVD, otherwise supple


CHEST: decreased BS bases, no wheezes audible


HEART:  regular distant,  S1S2, no murmur audible, no rubs.


ABD:  soft,  obese, no distention, no tympany, no palp tenderness, BS hypoactive


EXT:   No peripheral/ digital cyanosis, no calf tenderness or palpable cords, 

distal pulses intact and symmetrical. 


NEURO:  no gross focal motor deficits 


SKIN:   no rashes





LABS: 


WBC= 19.2


HGB= 11.1


PLTs=  452K 





Na= 141


K= 4.4


HCO3=22


CL=  104


BUN/Cr=  17/0.5


BS=  186


Lipid studies normal








MAJOR PROBLEMS:


1.   Acute Pancreatitis vs ?? Ascending Cholangitis:  etiology- enlarged CBD on 

abdominal US.  Pancreas not visualized well, would get CTAP if clinical status 

worsens. 


n   Continue aggressive IVF, has been on  ml/ hr; may need to increase to 

500ml/hr if urine output is low. So far, has not urinated while in Peds unit 

for the last 5 hours. 


n   Monitor U/O


n   Hold on abx coverage for now.


n   Consider Lactate and PCT level monitoring. 


n   CXR. BISAP score is 1-2.


n   GI eval for appropriateness of ERCP.


n   Pregnancy testing was negative in ER. 


n   Move to ICU for closer monitoring. 





Addendum: MRCP requested, [facility] MR non-functional, have made arrangements for MR 

study to be done at [facility]. Patient's overall status is non-critical 

and will return to [facility] ICU post-MRI study. 





CCU Objective





- Vital Signs / Intake & Output


Vital Signs (Last 4 hours): 


Vital Signs











  Temp Pulse Resp BP Pulse Ox


 


 03/01/18 17:20  100.1 F H  121 H  17  120/78  97


 


 03/01/18 16:45   125 H  21  139/77 


 


 03/01/18 15:55  102.3 F H  113 H  21  114/70 


 


 03/01/18 14:55  102.1 F H    


 


 03/01/18 14:10  102.1 F H  107 H  18  134/87 











Intake and Output (Last 8hrs): 


 Intake & Output











 03/01/18 03/01/18 03/01/18





 06:59 14:59 22:59


 


Intake Total  2000 


 


Balance  2000 


 


Weight  244 lb 


 


Intake:   


 


  IV  2000 


 


    Intravenous #1  1000 


 


    Intravenous #2  1000 














- Physical Exam


Head: Positive for: Normocephalic


Pupils: Positive for: PERRL


Extroacular Muscles: Positive for: EOMI


Conjunctiva: Positive for: Normal.  Negative for: Icteric


Mouth: Positive for: Moist Mucous Membranes


Neck: Negative for: JVD


Respiratory/Chest: Positive for: Clear to Auscultation


Cardiovascular: Positive for: Regular Rate and Rhythm, Tachycardic.  Negative 

for: Murmurs, Rub


Abdomen: Positive for: Normal Bowel Sounds.  Negative for: Tenderness, 

Distention, Mass/Organomegaly


Lower Extremity: Positive for: NORMAL PULSES.  Negative for: Edema, CALF 

TENDERNESS, Cyanosis


Neurological: Positive for: Motor Func Grossly Intact, Normal Sensory Function


Skin: Positive for: Warm, Dry.  Negative for: Rashes


Psychiatric: Positive for: Alert, Oriented x 3





- Medications


Active Medications: 


Active Medications











Generic Name Dose Route Start Last Admin





  Trade Name Freq  PRN Reason Stop Dose Admin


 


Heparin Sodium (Porcine)  5,000 units  03/01/18 17:00  03/01/18 16:44





  Heparin  SC   5,000 units





  Q8 FAUZIA   Administration





  Protocol   


 


Hydromorphone HCl  1 mg  03/01/18 13:17  





  Dilaudid  IVP   





  Q6 PRN   





  Pain, severe (8-10)   


 


Hydromorphone HCl  0.5 mg  03/01/18 13:22  





  Dilaudid  IVP  03/03/18 13:23  





  Q4 PRN   





  Pain, moderate (4-7)   


 


Piperacillin Sod/Tazobactam  100 mls @ 100 mls/hr  03/01/18 21:00  





  Sod 3.375 gm/ Sodium Chloride  IVPB   





  Q12 FAUZIA   





  Protocol   


 


Lactated Ringer's  1,000 mls @ 500 mls/hr  03/01/18 17:38  





  Lactated Ringer's  IV   





  .Q2H FAUZIA   


 


Ondansetron HCl  4 mg  03/01/18 13:17  





  Zofran Inj  IVP   





  Q6 PRN   





  Nausea/Vomiting   














- Patient Studies


Lab Studies: 


 Lab Studies











  03/01/18 03/01/18 03/01/18 Range/Units





  17:48 11:05 10:35 


 


WBC     (4.8-10.8)  K/uL


 


RBC     (3.80-5.20)  Mil/uL


 


Hgb     (12.0-16.0)  g/dL


 


Hct     (34.0-47.0)  %


 


MCV     (81.0-99.0)  fl


 


MCH     (27.0-31.0)  pg


 


MCHC     (33.0-37.0)  g/dL


 


RDW     (11.5-14.5)  %


 


Plt Count     (130-400)  K/uL


 


MPV     (7.2-11.7)  fl


 


Neut % (Auto)     (50.0-75.0)  %


 


Lymph % (Auto)     (20.0-40.0)  %


 


Mono % (Auto)     (0.0-10.0)  %


 


Eos % (Auto)     (0.0-4.0)  %


 


Baso % (Auto)     (0.0-2.0)  %


 


Neut # (Auto)     (1.8-7.0)  K/uL


 


Lymph # (Auto)     (1.0-4.3)  K/uL


 


Mono # (Auto)     (0.0-0.8)  K/uL


 


Eos # (Auto)     (0.0-0.7)  K/uL


 


Baso # (Auto)     (0.0-0.2)  K/uL


 


Neutrophils % (Manual)     (42-75)  %


 


Band Neutrophils %     (0-2)  %


 


Lymphocytes % (Manual)     (20-50)  %


 


Monocytes % (Manual)     (0-10)  %


 


Toxic Granulation     


 


Platelet Estimate     (NORMAL)  


 


Large Platelets     


 


Hypochromasia (manual)     


 


Anisocytosis (manual)     


 


Microcytosis (manual)     


 


PT     (9.8-13.1)  Seconds


 


INR     (0.9-1.2)  


 


APTT     (25.6-37.1)  Seconds


 


Sodium     (132-148)  mmol/l


 


Potassium     (3.6-5.0)  MMOL/L


 


Chloride     ()  mmol/L


 


Carbon Dioxide     (22-30)  mmol/L


 


Anion Gap     (10-20)  


 


BUN     (7-17)  mg/dl


 


Creatinine     (0.7-1.2)  mg/dl


 


Est GFR (African Amer)     


 


Est GFR (Non-Af Amer)     


 


POC Glucose (mg/dL)  93    ()  mg/dL


 


Random Glucose     ()  mg/dL


 


Calcium     (8.4-10.2)  mg/dL


 


Total Bilirubin     (0.2-1.3)  mg/dl


 


AST     (14-36)  U/L


 


ALT     (9-52)  U/L


 


Alkaline Phosphatase     ()  U/L


 


Total Protein     (6.3-8.2)  G/DL


 


Albumin     (3.5-5.0)  g/dL


 


Globulin     (2.2-3.9)  gm/dL


 


Albumin/Globulin Ratio     (1.0-2.1)  


 


Triglycerides     (0-149)  mg/DL


 


Cholesterol     (0-199)  mg/dL


 


LDL Cholesterol Direct     (0-129)  mg/dL


 


HDL Cholesterol     (30-70)  MG/DL


 


Lipase     ()  U/L


 


Urine Color    Halle  (YELLOW)  


 


Urine Clarity    Slighty-cloudy  (Clear)  


 


Urine pH    5.0  (5.0-8.0)  


 


Ur Specific Gravity    1.035 H  (1.003-1.030)  


 


Urine Protein    30  (NEGATIVE)  mg/dL


 


Urine Glucose (UA)    Neg  (Normal)  mg/dL


 


Urine Ketones    Negative  (NEGATIVE)  mg/dL


 


Urine Blood    Negative  (NEGATIVE)  


 


Urine Nitrate    Negative  (NEGATIVE)  


 


Urine Bilirubin    Negative  (NEGATIVE)  


 


Urine Urobilinogen    0.2-1.0  (0.2-1.0)  mg/dL


 


Ur Leukocyte Esterase    Neg  (Negative)  Matt/uL


 


Urine RBC (Auto)    3  (0-3)  /hpf


 


Urine Microscopic WBC    3  (0-5)  /hpf


 


Ur Squamous Epith Cells    5  (0-5)  /hpf


 


Blood Type   O POSITIVE   


 


Antibody Screen   Negative   


 


BBK History Checked   Patient has bt   














  03/01/18 03/01/18 03/01/18 Range/Units





  10:10 10:10 10:10 


 


WBC     (4.8-10.8)  K/uL


 


RBC     (3.80-5.20)  Mil/uL


 


Hgb     (12.0-16.0)  g/dL


 


Hct     (34.0-47.0)  %


 


MCV     (81.0-99.0)  fl


 


MCH     (27.0-31.0)  pg


 


MCHC     (33.0-37.0)  g/dL


 


RDW     (11.5-14.5)  %


 


Plt Count     (130-400)  K/uL


 


MPV     (7.2-11.7)  fl


 


Neut % (Auto)     (50.0-75.0)  %


 


Lymph % (Auto)     (20.0-40.0)  %


 


Mono % (Auto)     (0.0-10.0)  %


 


Eos % (Auto)     (0.0-4.0)  %


 


Baso % (Auto)     (0.0-2.0)  %


 


Neut # (Auto)     (1.8-7.0)  K/uL


 


Lymph # (Auto)     (1.0-4.3)  K/uL


 


Mono # (Auto)     (0.0-0.8)  K/uL


 


Eos # (Auto)     (0.0-0.7)  K/uL


 


Baso # (Auto)     (0.0-0.2)  K/uL


 


Neutrophils % (Manual)     (42-75)  %


 


Band Neutrophils %     (0-2)  %


 


Lymphocytes % (Manual)     (20-50)  %


 


Monocytes % (Manual)     (0-10)  %


 


Toxic Granulation     


 


Platelet Estimate     (NORMAL)  


 


Large Platelets     


 


Hypochromasia (manual)     


 


Anisocytosis (manual)     


 


Microcytosis (manual)     


 


PT   11.6   (9.8-13.1)  Seconds


 


INR   1.0   (0.9-1.2)  


 


APTT   34.0   (25.6-37.1)  Seconds


 


Sodium    141  (132-148)  mmol/l


 


Potassium    4.4  (3.6-5.0)  MMOL/L


 


Chloride    104  ()  mmol/L


 


Carbon Dioxide    22  (22-30)  mmol/L


 


Anion Gap    19  (10-20)  


 


BUN    17  (7-17)  mg/dl


 


Creatinine    0.5 L  (0.7-1.2)  mg/dl


 


Est GFR (African Amer)    > 60  


 


Est GFR (Non-Af Amer)    > 60  


 


POC Glucose (mg/dL)     ()  mg/dL


 


Random Glucose    86  ()  mg/dL


 


Calcium    8.7  (8.4-10.2)  mg/dL


 


Total Bilirubin    1.3  (0.2-1.3)  mg/dl


 


AST    565 H D  (14-36)  U/L


 


ALT    418 H D  (9-52)  U/L


 


Alkaline Phosphatase    203 H D  ()  U/L


 


Total Protein    7.6  (6.3-8.2)  G/DL


 


Albumin    3.8  (3.5-5.0)  g/dL


 


Globulin    3.8  (2.2-3.9)  gm/dL


 


Albumin/Globulin Ratio    1.0  (1.0-2.1)  


 


Triglycerides    94  (0-149)  mg/DL


 


Cholesterol    169  (0-199)  mg/dL


 


LDL Cholesterol Direct    69  (0-129)  mg/dL


 


HDL Cholesterol    50  (30-70)  MG/DL


 


Lipase    07271 H  ()  U/L


 


Urine Color     (YELLOW)  


 


Urine Clarity     (Clear)  


 


Urine pH     (5.0-8.0)  


 


Ur Specific Gravity     (1.003-1.030)  


 


Urine Protein     (NEGATIVE)  mg/dL


 


Urine Glucose (UA)     (Normal)  mg/dL


 


Urine Ketones     (NEGATIVE)  mg/dL


 


Urine Blood     (NEGATIVE)  


 


Urine Nitrate     (NEGATIVE)  


 


Urine Bilirubin     (NEGATIVE)  


 


Urine Urobilinogen     (0.2-1.0)  mg/dL


 


Ur Leukocyte Esterase     (Negative)  Matt/uL


 


Urine RBC (Auto)     (0-3)  /hpf


 


Urine Microscopic WBC     (0-5)  /hpf


 


Ur Squamous Epith Cells     (0-5)  /hpf


 


Blood Type  Cancelled    


 


Antibody Screen  Cancelled    


 


BBK History Checked  Cancelled    














  03/01/18 Range/Units





  10:10 


 


WBC  19.2 H D  (4.8-10.8)  K/uL


 


RBC  4.95  (3.80-5.20)  Mil/uL


 


Hgb  11.1 L  (12.0-16.0)  g/dL


 


Hct  34.9  (34.0-47.0)  %


 


MCV  70.5 L D  (81.0-99.0)  fl


 


MCH  22.5 L  (27.0-31.0)  pg


 


MCHC  31.9 L  (33.0-37.0)  g/dL


 


RDW  16.2 H  (11.5-14.5)  %


 


Plt Count  452 H  (130-400)  K/uL


 


MPV  8.8  (7.2-11.7)  fl


 


Neut % (Auto)  94.5 H  (50.0-75.0)  %


 


Lymph % (Auto)  2.4 L  (20.0-40.0)  %


 


Mono % (Auto)  2.5  (0.0-10.0)  %


 


Eos % (Auto)  0.2  (0.0-4.0)  %


 


Baso % (Auto)  0.4  (0.0-2.0)  %


 


Neut # (Auto)  18.2 H  (1.8-7.0)  K/uL


 


Lymph # (Auto)  0.5 L  (1.0-4.3)  K/uL


 


Mono # (Auto)  0.5  (0.0-0.8)  K/uL


 


Eos # (Auto)  0.0  (0.0-0.7)  K/uL


 


Baso # (Auto)  0.1  (0.0-0.2)  K/uL


 


Neutrophils % (Manual)  92 H  (42-75)  %


 


Band Neutrophils %  1  (0-2)  %


 


Lymphocytes % (Manual)  6 L  (20-50)  %


 


Monocytes % (Manual)  1  (0-10)  %


 


Toxic Granulation  Present  


 


Platelet Estimate  Slightly increased H  (NORMAL)  


 


Large Platelets  Present  


 


Hypochromasia (manual)  Slight  


 


Anisocytosis (manual)  Slight  


 


Microcytosis (manual)  Slight  


 


PT   (9.8-13.1)  Seconds


 


INR   (0.9-1.2)  


 


APTT   (25.6-37.1)  Seconds


 


Sodium   (132-148)  mmol/l


 


Potassium   (3.6-5.0)  MMOL/L


 


Chloride   ()  mmol/L


 


Carbon Dioxide   (22-30)  mmol/L


 


Anion Gap   (10-20)  


 


BUN   (7-17)  mg/dl


 


Creatinine   (0.7-1.2)  mg/dl


 


Est GFR (African Amer)   


 


Est GFR (Non-Af Amer)   


 


POC Glucose (mg/dL)   ()  mg/dL


 


Random Glucose   ()  mg/dL


 


Calcium   (8.4-10.2)  mg/dL


 


Total Bilirubin   (0.2-1.3)  mg/dl


 


AST   (14-36)  U/L


 


ALT   (9-52)  U/L


 


Alkaline Phosphatase   ()  U/L


 


Total Protein   (6.3-8.2)  G/DL


 


Albumin   (3.5-5.0)  g/dL


 


Globulin   (2.2-3.9)  gm/dL


 


Albumin/Globulin Ratio   (1.0-2.1)  


 


Triglycerides   (0-149)  mg/DL


 


Cholesterol   (0-199)  mg/dL


 


LDL Cholesterol Direct   (0-129)  mg/dL


 


HDL Cholesterol   (30-70)  MG/DL


 


Lipase   ()  U/L


 


Urine Color   (YELLOW)  


 


Urine Clarity   (Clear)  


 


Urine pH   (5.0-8.0)  


 


Ur Specific Gravity   (1.003-1.030)  


 


Urine Protein   (NEGATIVE)  mg/dL


 


Urine Glucose (UA)   (Normal)  mg/dL


 


Urine Ketones   (NEGATIVE)  mg/dL


 


Urine Blood   (NEGATIVE)  


 


Urine Nitrate   (NEGATIVE)  


 


Urine Bilirubin   (NEGATIVE)  


 


Urine Urobilinogen   (0.2-1.0)  mg/dL


 


Ur Leukocyte Esterase   (Negative)  Matt/uL


 


Urine RBC (Auto)   (0-3)  /hpf


 


Urine Microscopic WBC   (0-5)  /hpf


 


Ur Squamous Epith Cells   (0-5)  /hpf


 


Blood Type   


 


Antibody Screen   


 


BBK History Checked   








 Laboratory Results - last 24 hr











  03/01/18 03/01/18 03/01/18





  10:10 10:10 10:10


 


WBC  19.2 H D  


 


RBC  4.95  


 


Hgb  11.1 L  


 


Hct  34.9  


 


MCV  70.5 L D  


 


MCH  22.5 L  


 


MCHC  31.9 L  


 


RDW  16.2 H  


 


Plt Count  452 H  


 


MPV  8.8  


 


Neut % (Auto)  94.5 H  


 


Lymph % (Auto)  2.4 L  


 


Mono % (Auto)  2.5  


 


Eos % (Auto)  0.2  


 


Baso % (Auto)  0.4  


 


Neut # (Auto)  18.2 H  


 


Lymph # (Auto)  0.5 L  


 


Mono # (Auto)  0.5  


 


Eos # (Auto)  0.0  


 


Baso # (Auto)  0.1  


 


Neutrophils % (Manual)  92 H  


 


Band Neutrophils %  1  


 


Lymphocytes % (Manual)  6 L  


 


Monocytes % (Manual)  1  


 


Toxic Granulation  Present  


 


Platelet Estimate  Slightly increased H  


 


Large Platelets  Present  


 


Hypochromasia (manual)  Slight  


 


Anisocytosis (manual)  Slight  


 


Microcytosis (manual)  Slight  


 


PT    11.6


 


INR    1.0


 


APTT    34.0


 


Sodium   141 


 


Potassium   4.4 


 


Chloride   104 


 


Carbon Dioxide   22 


 


Anion Gap   19 


 


BUN   17 


 


Creatinine   0.5 L 


 


Est GFR ( Amer)   > 60 


 


Est GFR (Non-Af Amer)   > 60 


 


POC Glucose (mg/dL)   


 


Random Glucose   86 


 


Calcium   8.7 


 


Total Bilirubin   1.3 


 


AST   565 H D 


 


ALT   418 H D 


 


Alkaline Phosphatase   203 H D 


 


Total Protein   7.6 


 


Albumin   3.8 


 


Globulin   3.8 


 


Albumin/Globulin Ratio   1.0 


 


Triglycerides   94 


 


Cholesterol   169 


 


LDL Cholesterol Direct   69 


 


HDL Cholesterol   50 


 


Lipase   82088 H 


 


Urine Color   


 


Urine Clarity   


 


Urine pH   


 


Ur Specific Gravity   


 


Urine Protein   


 


Urine Glucose (UA)   


 


Urine Ketones   


 


Urine Blood   


 


Urine Nitrate   


 


Urine Bilirubin   


 


Urine Urobilinogen   


 


Ur Leukocyte Esterase   


 


Urine RBC (Auto)   


 


Urine Microscopic WBC   


 


Ur Squamous Epith Cells   


 


Blood Type   


 


Antibody Screen   


 


BBK History Checked   














  03/01/18 03/01/18 03/01/18





  10:10 10:35 11:05


 


WBC   


 


RBC   


 


Hgb   


 


Hct   


 


MCV   


 


MCH   


 


MCHC   


 


RDW   


 


Plt Count   


 


MPV   


 


Neut % (Auto)   


 


Lymph % (Auto)   


 


Mono % (Auto)   


 


Eos % (Auto)   


 


Baso % (Auto)   


 


Neut # (Auto)   


 


Lymph # (Auto)   


 


Mono # (Auto)   


 


Eos # (Auto)   


 


Baso # (Auto)   


 


Neutrophils % (Manual)   


 


Band Neutrophils %   


 


Lymphocytes % (Manual)   


 


Monocytes % (Manual)   


 


Toxic Granulation   


 


Platelet Estimate   


 


Large Platelets   


 


Hypochromasia (manual)   


 


Anisocytosis (manual)   


 


Microcytosis (manual)   


 


PT   


 


INR   


 


APTT   


 


Sodium   


 


Potassium   


 


Chloride   


 


Carbon Dioxide   


 


Anion Gap   


 


BUN   


 


Creatinine   


 


Est GFR ( Amer)   


 


Est GFR (Non-Af Amer)   


 


POC Glucose (mg/dL)   


 


Random Glucose   


 


Calcium   


 


Total Bilirubin   


 


AST   


 


ALT   


 


Alkaline Phosphatase   


 


Total Protein   


 


Albumin   


 


Globulin   


 


Albumin/Globulin Ratio   


 


Triglycerides   


 


Cholesterol   


 


LDL Cholesterol Direct   


 


HDL Cholesterol   


 


Lipase   


 


Urine Color   Halle 


 


Urine Clarity   Slighty-cloudy 


 


Urine pH   5.0 


 


Ur Specific Gravity   1.035 H 


 


Urine Protein   30 


 


Urine Glucose (UA)   Neg 


 


Urine Ketones   Negative 


 


Urine Blood   Negative 


 


Urine Nitrate   Negative 


 


Urine Bilirubin   Negative 


 


Urine Urobilinogen   0.2-1.0 


 


Ur Leukocyte Esterase   Neg 


 


Urine RBC (Auto)   3 


 


Urine Microscopic WBC   3 


 


Ur Squamous Epith Cells   5 


 


Blood Type  Cancelled   O POSITIVE


 


Antibody Screen  Cancelled   Negative


 


BBK History Checked  Cancelled   Patient has bt














  03/01/18





  17:48


 


WBC 


 


RBC 


 


Hgb 


 


Hct 


 


MCV 


 


MCH 


 


MCHC 


 


RDW 


 


Plt Count 


 


MPV 


 


Neut % (Auto) 


 


Lymph % (Auto) 


 


Mono % (Auto) 


 


Eos % (Auto) 


 


Baso % (Auto) 


 


Neut # (Auto) 


 


Lymph # (Auto) 


 


Mono # (Auto) 


 


Eos # (Auto) 


 


Baso # (Auto) 


 


Neutrophils % (Manual) 


 


Band Neutrophils % 


 


Lymphocytes % (Manual) 


 


Monocytes % (Manual) 


 


Toxic Granulation 


 


Platelet Estimate 


 


Large Platelets 


 


Hypochromasia (manual) 


 


Anisocytosis (manual) 


 


Microcytosis (manual) 


 


PT 


 


INR 


 


APTT 


 


Sodium 


 


Potassium 


 


Chloride 


 


Carbon Dioxide 


 


Anion Gap 


 


BUN 


 


Creatinine 


 


Est GFR ( Amer) 


 


Est GFR (Non-Af Amer) 


 


POC Glucose (mg/dL)  93


 


Random Glucose 


 


Calcium 


 


Total Bilirubin 


 


AST 


 


ALT 


 


Alkaline Phosphatase 


 


Total Protein 


 


Albumin 


 


Globulin 


 


Albumin/Globulin Ratio 


 


Triglycerides 


 


Cholesterol 


 


LDL Cholesterol Direct 


 


HDL Cholesterol 


 


Lipase 


 


Urine Color 


 


Urine Clarity 


 


Urine pH 


 


Ur Specific Gravity 


 


Urine Protein 


 


Urine Glucose (UA) 


 


Urine Ketones 


 


Urine Blood 


 


Urine Nitrate 


 


Urine Bilirubin 


 


Urine Urobilinogen 


 


Ur Leukocyte Esterase 


 


Urine RBC (Auto) 


 


Urine Microscopic WBC 


 


Ur Squamous Epith Cells 


 


Blood Type 


 


Antibody Screen 


 


BBK History Checked 














Review of Systems





- Review of Systems


All systems: reviewed and no additional remarkable complaints except (as above)





Critical Care Progress Note





- Nutrition


Nutrition: 


 Nutrition











 Category Date Time Status


 


 NPO Diet [DIET] Diets  03/01/18 Breakfast Active

## 2018-03-01 NOTE — CP.PCM.HP
History of Present Illness





- History of Present Illness


History of Present Illness: 


CC: Epigastric Pain





19F p/w epigastric pain that started around midnight last night and is 

described as sharp, constant radiating into her back with associated N/NBNB 

vomiting, SOB, and chest tightness.  She denies fevers, chills, diarrhea, or 

viral prodrome.  Pt also reports this has happened in the past.  Pain is 10/10 

and when seen in the ED remains 6/10 after receiving pain medication.





PMHx: acalculus cholecystitis


PSHx: Fibroid removal


SHx: Denies smoking and alcohol


ALL: NKDA





LMP: 2018


, sexually active, uses condoms





ED COURSE


AVSS (HR-95)


CBC: leukocytosis (>19)


ALP/AST/ALT: 203H/565H/418


Lipase: 76511J





Present on Admission





- Present on Admission


Any Indicators Present on Admission: No





Review of Systems





- Review of Systems


All systems: reviewed and no additional remarkable complaints except





- Respiratory


Respiratory: Dyspnea





- Gastrointestinal


Gastrointestinal: Abdominal Pain (epigastric)





Past Patient History





- Past Medical History & Family History


Past Medical History?: No





- Past Social History


Smoking Status: Never Smoked





- CARDIAC


Hx Cardiac Disorders: No





- PULMONARY


Hx Respiratory Disorders: No





- NEUROLOGICAL


Hx Neurological Disorder: No





- HEENT


Hx HEENT Problems: No





- RENAL


Hx Chronic Kidney Disease: No





- ENDOCRINE/METABOLIC


Hx Endocrine Disorders: No





- HEMATOLOGICAL/ONCOLOGICAL


Hx Human Immunodeficiency Virus (HIV): No





- INTEGUMENTARY


Hx Dermatological Problems: No





- MUSCULOSKELETAL/RHEUMATOLOGICAL


Hx Musculoskeletal Disorders: No


Hx Falls: No





- GASTROINTESTINAL


Hx Gastrointestinal Disorders: Yes


Other/Comment: Cholecystitis





- GENITOURINARY/GYNECOLOGICAL


Hx Genitourinary Disorders: Yes


Hx Reproductive Disorders: Yes (Uterine fibroids)


Hx Urinary Tract Infection: Yes





- PSYCHIATRIC


Hx Psychophysiologic Disorder: No


Hx Substance Use: No





- SURGICAL HISTORY


Hx Surgeries: Yes


Other/Comment: fibroid removal/ removal of left ovary 3 mths ago





- ANESTHESIA


Hx Anesthesia: Yes


Hx Anesthesia Reactions: No


Hx Malignant Hyperthermia: No





Meds


Allergies/Adverse Reactions: 


 Allergies











Allergy/AdvReac Type Severity Reaction Status Date / Time


 


No Known Allergies Allergy   Verified 17 22:59














Physical Exam





- Constitutional


Appears: Non-toxic





- Head Exam


Head Exam: ATRAUMATIC, NORMAL INSPECTION





- Eye Exam


Eye Exam: EOMI, Normal appearance





- ENT Exam


ENT Exam: Mucous Membranes Moist, Normal Exam





- Respiratory Exam


Respiratory Exam: Clear to Auscultation Bilateral, NORMAL BREATHING PATTERN.  

absent: Rales, Rhonchi, Wheezes





- Cardiovascular Exam


Cardiovascular Exam: Tachycardia.  absent: +S1, +S2





- GI/Abdominal Exam


GI & Abdominal Exam: Normal Bowel Sounds, Soft, Tenderness (Epigastric w/ 

midline ttp around periumbilical).  absent: Guarding, Rebound





- Neurological Exam


Neurological exam: Alert, Oriented x3





- Psychiatric Exam


Psychiatric exam: Normal Affect, Normal Mood





- Skin


Skin Exam: Dry, Warm





Results





- Vital Signs


Recent Vital Signs: 





 Last Vital Signs











Temp  36.8 C   18 08:47


 


Pulse  102 H  18 08:47


 


Resp  16   18 08:47


 


BP  132/81   18 08:47


 


Pulse Ox  100   18 09:51














- Labs


Result Diagrams: 


 18 04:18





 18 10:10


Labs: 





 Laboratory Results - last 24 hr











  18





  10:10 10:10 10:10


 


WBC  19.2 H D  


 


RBC  4.95  


 


Hgb  11.1 L  


 


Hct  34.9  


 


MCV  70.5 L D  


 


MCH  22.5 L  


 


MCHC  31.9 L  


 


RDW  16.2 H  


 


Plt Count  452 H  


 


MPV  8.8  


 


Neut % (Auto)  94.5 H  


 


Lymph % (Auto)  2.4 L  


 


Mono % (Auto)  2.5  


 


Eos % (Auto)  0.2  


 


Baso % (Auto)  0.4  


 


Neut # (Auto)  18.2 H  


 


Lymph # (Auto)  0.5 L  


 


Mono # (Auto)  0.5  


 


Eos # (Auto)  0.0  


 


Baso # (Auto)  0.1  


 


Neutrophils % (Manual)  92 H  


 


Band Neutrophils %  1  


 


Lymphocytes % (Manual)  6 L  


 


Monocytes % (Manual)  1  


 


Toxic Granulation  Present  


 


Platelet Estimate  Slightly increased H  


 


Large Platelets  Present  


 


Hypochromasia (manual)  Slight  


 


Anisocytosis (manual)  Slight  


 


Microcytosis (manual)  Slight  


 


PT    11.6


 


INR    1.0


 


APTT    34.0


 


Sodium   141 


 


Potassium   4.4 


 


Chloride   104 


 


Carbon Dioxide   22 


 


Anion Gap   19 


 


BUN   17 


 


Creatinine   0.5 L 


 


Est GFR ( Amer)   > 60 


 


Est GFR (Non-Af Amer)   > 60 


 


Random Glucose   86 


 


Calcium   8.7 


 


Total Bilirubin   1.3 


 


AST   565 H D 


 


ALT   418 H D 


 


Alkaline Phosphatase   203 H D 


 


Total Protein   7.6 


 


Albumin   3.8 


 


Globulin   3.8 


 


Albumin/Globulin Ratio   1.0 


 


Lipase   89948 H 


 


Urine Color   


 


Urine Clarity   


 


Urine pH   


 


Ur Specific Gravity   


 


Urine Protein   


 


Urine Glucose (UA)   


 


Urine Ketones   


 


Urine Blood   


 


Urine Nitrate   


 


Urine Bilirubin   


 


Urine Urobilinogen   


 


Ur Leukocyte Esterase   


 


Urine RBC (Auto)   


 


Urine Microscopic WBC   


 


Ur Squamous Epith Cells   


 


Blood Type   


 


Antibody Screen   


 


BBK History Checked   














  18





  10:10 10:35 11:05


 


WBC   


 


RBC   


 


Hgb   


 


Hct   


 


MCV   


 


MCH   


 


MCHC   


 


RDW   


 


Plt Count   


 


MPV   


 


Neut % (Auto)   


 


Lymph % (Auto)   


 


Mono % (Auto)   


 


Eos % (Auto)   


 


Baso % (Auto)   


 


Neut # (Auto)   


 


Lymph # (Auto)   


 


Mono # (Auto)   


 


Eos # (Auto)   


 


Baso # (Auto)   


 


Neutrophils % (Manual)   


 


Band Neutrophils %   


 


Lymphocytes % (Manual)   


 


Monocytes % (Manual)   


 


Toxic Granulation   


 


Platelet Estimate   


 


Large Platelets   


 


Hypochromasia (manual)   


 


Anisocytosis (manual)   


 


Microcytosis (manual)   


 


PT   


 


INR   


 


APTT   


 


Sodium   


 


Potassium   


 


Chloride   


 


Carbon Dioxide   


 


Anion Gap   


 


BUN   


 


Creatinine   


 


Est GFR ( Amer)   


 


Est GFR (Non-Af Amer)   


 


Random Glucose   


 


Calcium   


 


Total Bilirubin   


 


AST   


 


ALT   


 


Alkaline Phosphatase   


 


Total Protein   


 


Albumin   


 


Globulin   


 


Albumin/Globulin Ratio   


 


Lipase   


 


Urine Color   Halle 


 


Urine Clarity   Slighty-cloudy 


 


Urine pH   5.0 


 


Ur Specific Gravity   1.035 H 


 


Urine Protein   30 


 


Urine Glucose (UA)   Neg 


 


Urine Ketones   Negative 


 


Urine Blood   Negative 


 


Urine Nitrate   Negative 


 


Urine Bilirubin   Negative 


 


Urine Urobilinogen   0.2-1.0 


 


Ur Leukocyte Esterase   Neg 


 


Urine RBC (Auto)   3 


 


Urine Microscopic WBC   3 


 


Ur Squamous Epith Cells   5 


 


Blood Type  Cancelled   O POSITIVE


 


Antibody Screen  Cancelled   Negative


 


BBK History Checked  Cancelled   Patient has bt














Assessment & Plan


(1) Pancreatitis, acute


Assessment and Plan: 


Acute pancreatitis with dilated CBD suspicious for biliary etiology but not 

demonstrated on imaging from the ED.





- Bolus 2L LR then LR@ 300cc/hr


- Pain Management 


- GI Consult (Dr Calhoun): will f/u recs


- Gen/Surg Consult: MRCP to r/o biliary etiology, f/u recs


- NPO


- Zofran 4mg, IV, PRN


Status: Acute   





(2) DVT prophylaxis


Assessment and Plan: 


Heparin 5000U, SC Q8H


Status: Acute

## 2018-03-01 NOTE — CP.PCM.PCO
Assessment/Plan





- Assessment and Plan (Free Text)


Assessment: 


  Pt seen and examined.  transferred to the ICU   Discussed with GI/Intensivist 


Crook Placed -500 cc clear urine noted.  


Continue aggressive hydration   For MRCP 








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  

Pt is unaware   Will call GYN consult

## 2018-03-01 NOTE — ED PDOC
HPI: Abdomen


Time Seen by Provider: 03/01/18 09:45


Chief Complaint (Nursing): Abdominal Pain


Chief Complaint (Provider): abdominal pain


History Per: Patient (18 y/o female h/o acalculus cholecystitis 12/2017 here 

with epigastric radiating to back since last night midnight. States she has 

pain intermittently in past but constant since today. (+) vomiting.  No fevers/

chills.  Has h/o fibroid removal.  States she was treated with antibiotics and d

/c home after inpatient visit for acalculus cholecystitis.)





Past Medical History


Reviewed: Historical Data, Nursing Documentation, Vital Signs


Vital Signs: 


 Last Vital Signs











Temp  98.3 F   03/01/18 08:47


 


Pulse  102 H  03/01/18 08:47


 


Resp  16   03/01/18 08:47


 


BP  132/81   03/01/18 08:47


 


Pulse Ox  100   03/01/18 09:51














- Medical History


PMH: 


   Denies: HIV, Chronic Kidney Disease





- Family History


Family History: States: Unknown Family Hx





- Home Medications


Home Medications: 


 Ambulatory Orders











 Medication  Instructions  Recorded


 


No Known Home Med  12/09/17














- Allergies


Allergies/Adverse Reactions: 


 Allergies











Allergy/AdvReac Type Severity Reaction Status Date / Time


 


No Known Allergies Allergy   Verified 12/08/17 22:59














Review of Systems


ROS Statement: Except As Marked, All Systems Reviewed And Found Negative





Physical Exam





- Reviewed


Nursing Documentation Reviewed: Yes


Vital Signs Reviewed: Yes





- Physical Exam


Appears: Positive for: Well, Non-toxic, No Acute Distress


Head Exam: Positive for: ATRAUMATIC, NORMAL INSPECTION, NORMOCEPHALIC


Skin: Positive for: Normal Color, Warm, DRY


Eye Exam: Positive for: EOMI, Normal appearance, PERRL


ENT: Positive for: Normal ENT Inspection


Neck: Positive for: Normal, Painless ROM


Cardiovascular/Chest: Positive for: Regular Rate, Rhythm


Respiratory: Positive for: CNT, Normal Breath Sounds


Gastrointestinal/Abdominal: Positive for: Bowel Sounds, Soft, Tenderness (RUQ/

epigastric/LUQ tenderness; hypoactive bowel sounds.).  Negative for: Normal 

Exam (obese abdomen.)


Back: Positive for: Normal Inspection


Extremity: Positive for: Normal ROM


Neurologic/Psych: Positive for: Alert, Oriented





- Laboratory Results


Result Diagrams: 


 03/01/18 10:10





 03/01/18 10:10


Urine Pregnancy POC: Negative





- ECG


O2 Sat by Pulse Oximetry: 100





- Progress


ED Course And Treament: 





pepcid 20 mg iv x 1 dose


morphine 4 mg iv x 1 dose


ns 1 liter wide open


zofran 4 mg iv x 1 dose





ELEVATED WBC NOTED. ZOSYN 4.5 GM IV X 1 DOSE





d/w family med resident for admission





d/w surg resident on call for Dr. Tan for consult





d/w Dr. Maloney.  LR 1 liter wide open and 300ml per hour ordered.





Patient noted with persistent abd pain.  Morphine 4 mg iv x 2nd dose ordered








Disposition





- Clinical Impression


Clinical Impression: 


 Pancreatitis, Elevated LFTs, Dilated cbd, acquired








- Patient ED Disposition


Is Patient to be Admitted: Yes





- Disposition


Disposition Time: 12:15


Condition: FAIR





- Pt Status Changed To:


Hospital Disposition Of: Inpatient





- Admit Certification


Admit to Inpatient:: After my assessment, the patient will require 

hospitalization for at least two midnights.  This is because of the severity of 

symptoms shown, intensity of services needed, and/or the medical risk in this 

patient being treated as an outpatient.

## 2018-03-01 NOTE — CP.PCM.CON
History of Present Illness





- History of Present Illness


History of Present Illness: 


GENERAL SURGERY CONSULT NOTE FOR DR. STRINGER





19F presents with abdominal pain that began yesterday night. Patient states she 

had this type of pain before in the past 3 months ago and was hospitalized for 

it and was treated for acalculous cholecystitis. Patient states the pain is 

mostly epigastric and radiates to her mid back. She admits to nausea and 

multiple emesis with gastric content, no blood. She denies fevers or chills. 

She denies change in bowel function. Denies any history of alcohol abuse. 





PMH: Fibroids


PSH: LSO


Social: denies tobacco, alcohol and illicit drug use


Alergies: NKDA





Past Patient History





- Past Medical History & Family History


Past Medical History?: No





- Past Social History


Smoking Status: Never Smoked





- CARDIAC


Hx Cardiac Disorders: No





- PULMONARY


Hx Respiratory Disorders: No





- NEUROLOGICAL


Hx Neurological Disorder: No





- HEENT


Hx HEENT Problems: No





- RENAL


Hx Chronic Kidney Disease: No





- ENDOCRINE/METABOLIC


Hx Endocrine Disorders: No





- HEMATOLOGICAL/ONCOLOGICAL


Hx Human Immunodeficiency Virus (HIV): No





- INTEGUMENTARY


Hx Dermatological Problems: No





- MUSCULOSKELETAL/RHEUMATOLOGICAL


Hx Musculoskeletal Disorders: No


Hx Falls: No





- GASTROINTESTINAL


Hx Gastrointestinal Disorders: Yes


Other/Comment: Cholecystitis





- GENITOURINARY/GYNECOLOGICAL


Hx Genitourinary Disorders: Yes


Hx Reproductive Disorders: Yes (Uterine fibroids)


Hx Urinary Tract Infection: Yes





- PSYCHIATRIC


Hx Psychophysiologic Disorder: No


Hx Substance Use: No





- SURGICAL HISTORY


Hx Surgeries: Yes


Other/Comment: fibroid removal/ removal of left ovary 3 mths ago





- ANESTHESIA


Hx Anesthesia: Yes


Hx Anesthesia Reactions: No


Hx Malignant Hyperthermia: No





Meds


Allergies/Adverse Reactions: 


 Allergies











Allergy/AdvReac Type Severity Reaction Status Date / Time


 


No Known Allergies Allergy   Verified 12/08/17 22:59














- Medications


Medications: 


 Current Medications





Lactated Ringer's (Lactated Ringer's)  1,000 mls @ 300 mls/hr IV .Q3H20M FAUZIA


Lactated Ringer's (Lactated Ringer's)  1,000 mls @ 1,000 mls/hr IV .Q1H FAUZIA











Physical Exam





- Constitutional


Appears: Non-toxic, No Acute Distress


Additional comments: 





morbidly obese





- Head Exam


Head Exam: ATRAUMATIC





- Eye Exam


Eye Exam: EOMI, Normal appearance, PERRL





- ENT Exam


ENT Exam: Mucous Membranes Moist





- Neck Exam


Neck exam: Positive for: Normal Inspection





- Respiratory Exam


Respiratory Exam: Clear to Auscultation Bilateral, NORMAL BREATHING PATTERN





- Cardiovascular Exam


Cardiovascular Exam: REGULAR RHYTHM, +S1, +S2





- GI/Abdominal Exam


Additional comments: 





soft, diffusely moderate to severely tender on palpation especially in 

epigastric region, non distended, non rigid





- Extremities Exam


Extremities exam: Positive for: normal inspection





- Back Exam


Back exam: NORMAL INSPECTION





- Neurological Exam


Neurological exam: Alert, Oriented x3





- Psychiatric Exam


Psychiatric exam: Normal Affect, Normal Mood





- Skin


Skin Exam: Dry, Intact, Normal Color, Warm





Results





- Vital Signs


Recent Vital Signs: 


 Last Vital Signs











Temp  98.3 F   03/01/18 08:47


 


Pulse  102 H  03/01/18 08:47


 


Resp  16   03/01/18 08:47


 


BP  132/81   03/01/18 08:47


 


Pulse Ox  100   03/01/18 09:51














- Labs


Result Diagrams: 


 03/01/18 10:10





 03/01/18 10:10


Labs: 


 Laboratory Results - last 24 hr











  03/01/18 03/01/18 03/01/18





  10:10 10:10 10:10


 


WBC  19.2 H D  


 


RBC  4.95  


 


Hgb  11.1 L  


 


Hct  34.9  


 


MCV  70.5 L D  


 


MCH  22.5 L  


 


MCHC  31.9 L  


 


RDW  16.2 H  


 


Plt Count  452 H  


 


MPV  8.8  


 


Neut % (Auto)  94.5 H  


 


Lymph % (Auto)  2.4 L  


 


Mono % (Auto)  2.5  


 


Eos % (Auto)  0.2  


 


Baso % (Auto)  0.4  


 


Neut # (Auto)  18.2 H  


 


Lymph # (Auto)  0.5 L  


 


Mono # (Auto)  0.5  


 


Eos # (Auto)  0.0  


 


Baso # (Auto)  0.1  


 


Neutrophils % (Manual)  92 H  


 


Band Neutrophils %  1  


 


Lymphocytes % (Manual)  6 L  


 


Monocytes % (Manual)  1  


 


Toxic Granulation  Present  


 


Platelet Estimate  Slightly increased H  


 


Large Platelets  Present  


 


Hypochromasia (manual)  Slight  


 


Anisocytosis (manual)  Slight  


 


Microcytosis (manual)  Slight  


 


PT    11.6


 


INR    1.0


 


APTT    34.0


 


Sodium   141 


 


Potassium   4.4 


 


Chloride   104 


 


Carbon Dioxide   22 


 


Anion Gap   19 


 


BUN   17 


 


Creatinine   0.5 L 


 


Est GFR ( Amer)   > 60 


 


Est GFR (Non-Af Amer)   > 60 


 


Random Glucose   86 


 


Calcium   8.7 


 


Total Bilirubin   1.3 


 


AST   565 H D 


 


ALT   418 H D 


 


Alkaline Phosphatase   203 H D 


 


Total Protein   7.6 


 


Albumin   3.8 


 


Globulin   3.8 


 


Albumin/Globulin Ratio   1.0 


 


Lipase   12014 H 


 


Urine Color   


 


Urine Clarity   


 


Urine pH   


 


Ur Specific Gravity   


 


Urine Protein   


 


Urine Glucose (UA)   


 


Urine Ketones   


 


Urine Blood   


 


Urine Nitrate   


 


Urine Bilirubin   


 


Urine Urobilinogen   


 


Ur Leukocyte Esterase   


 


Urine RBC (Auto)   


 


Urine Microscopic WBC   


 


Ur Squamous Epith Cells   


 


Blood Type   


 


Antibody Screen   


 


BBK History Checked   














  03/01/18 03/01/18 03/01/18





  10:10 10:35 11:05


 


WBC   


 


RBC   


 


Hgb   


 


Hct   


 


MCV   


 


MCH   


 


MCHC   


 


RDW   


 


Plt Count   


 


MPV   


 


Neut % (Auto)   


 


Lymph % (Auto)   


 


Mono % (Auto)   


 


Eos % (Auto)   


 


Baso % (Auto)   


 


Neut # (Auto)   


 


Lymph # (Auto)   


 


Mono # (Auto)   


 


Eos # (Auto)   


 


Baso # (Auto)   


 


Neutrophils % (Manual)   


 


Band Neutrophils %   


 


Lymphocytes % (Manual)   


 


Monocytes % (Manual)   


 


Toxic Granulation   


 


Platelet Estimate   


 


Large Platelets   


 


Hypochromasia (manual)   


 


Anisocytosis (manual)   


 


Microcytosis (manual)   


 


PT   


 


INR   


 


APTT   


 


Sodium   


 


Potassium   


 


Chloride   


 


Carbon Dioxide   


 


Anion Gap   


 


BUN   


 


Creatinine   


 


Est GFR ( Amer)   


 


Est GFR (Non-Af Amer)   


 


Random Glucose   


 


Calcium   


 


Total Bilirubin   


 


AST   


 


ALT   


 


Alkaline Phosphatase   


 


Total Protein   


 


Albumin   


 


Globulin   


 


Albumin/Globulin Ratio   


 


Lipase   


 


Urine Color   Halle 


 


Urine Clarity   Slighty-cloudy 


 


Urine pH   5.0 


 


Ur Specific Gravity   1.035 H 


 


Urine Protein   30 


 


Urine Glucose (UA)   Neg 


 


Urine Ketones   Negative 


 


Urine Blood   Negative 


 


Urine Nitrate   Negative 


 


Urine Bilirubin   Negative 


 


Urine Urobilinogen   0.2-1.0 


 


Ur Leukocyte Esterase   Neg 


 


Urine RBC (Auto)   3 


 


Urine Microscopic WBC   3 


 


Ur Squamous Epith Cells   5 


 


Blood Type  Cancelled   O POSITIVE


 


Antibody Screen  Cancelled   Negative


 


BBK History Checked  Cancelled   Patient has bt














Assessment & Plan





- Assessment and Plan (Free Text)


Assessment: 





19F with abdominal pain 2/2 Acute pancreatitis


Ultrasound: No cholelithiasis, no choledocholithiasis. There is intrahepatic 

biliary duct dilation and CBD measuring 18mm (up from 7mm 3 months ago)


Plan: 





- NPO, Fluid resuscitation


- Pain control, Nausea control


- Labs (triglycerides, alcohol level)


- Recommend GI consult


- Follow up MRCP


- f/u Labs AM





Further recs discuss with Dr. Valente Anne, PGY2

## 2018-03-02 NOTE — RAD
HISTORY:

r/o Pulm edema  



COMPARISON:

Chest radiograph dated 09/23/2017. 



FINDINGS:



LUNGS:

No active pulmonary disease.



PLEURA:

No significant pleural effusion identified, no pneumothorax apparent.



CARDIOVASCULAR:

Normal.



OSSEOUS STRUCTURES:

No significant abnormalities.



VISUALIZED UPPER ABDOMEN:

Normal.



OTHER FINDINGS:

None.



IMPRESSION:

No active disease.

## 2018-03-02 NOTE — CON
DATE:  03/01/2018



REFERRING DOCTOR:  Rome Victor MD and Virgen Lerner MD



REASON FOR CONSULTATION:  Abdominal pain.



HISTORY OF PRESENT ILLNESS:  This is a pleasant 19-year-old female, who

comes in for abdominal pain and discomfort for the time 24 to 48 hours. 

The patient has had two episodes in the past few months with _____

recurrence and exemption of pain, comes in with fevers and chills.  Feels

better.  At this point, no fevers, some pain, lying in bed comfortable, and

in no apparent distress.



PAST MEDICAL HISTORY:  As below.



PAST SURGICAL HISTORY:  As below.



MEDICATIONS:  Have been reviewed.



REVIEW OF SYSTEMS:  All other systems have been reviewed and are negative

apart from the HPI.



PHYSICAL EXAMINATION:

GENERAL:  A pleasant young female, lying comfortably in bed.

VITAL SIGNS:  Here in the hospital, grossly unremarkable.

HEENT:  Head normocephalic, atraumatic.  Eyes, pupils equally reactive to

light bilaterally.  No conjunctival pallor or icterus.

NECK:  Supple.  Normal range of motion.  No lymphadenopathy appreciated.

LUNGS:  Coarse breath sounds bilaterally.

HEART:  S1 and S2, regular rate and rhythm.  No murmurs appreciated.

ABDOMEN:  Soft and nontender.  Bowel sounds present.  No rebound.  No

guarding.  Epigastric discomfort.

RECTAL:  Deferred.

EXTREMITIES:  Pulses present bilaterally.

SKIN:  Warm, dry and intact.

NEUROLOGIC:  A and O x3.



LABORATORY DATA:  Labs were reviewed.  WBC 19.2, hemoglobin of 11.1,

hematocrit of 34.9, and platelet count is 452.  _____.  LFTs; bili is 1.3

and normal, , ALT 1418, alk phos 203, lipase of 19,000 plus. 

Ultrasound shows dilation of CBD.



ASSESSMENT AND PLAN:  This is a 19-year-old female with what appears to be 
gallstone pancreatitis.  From GI standpoint, aggressive IV dehydration.  The

dilation of common bile duct likely either from pancreatic edema versus

retained common bile duct stone.  MRCP is pending.  Aggressive IV

hydration, Zofran for now, H2 blockers, n.p.o.  Surgical consult

appreciated.  We will follow the patient with you.  Continue monitored setting.



Thank you for the consult.





__________________________________________

Erickson Calhoun MD/ PhD



cc:

MD Rome Peters MD



DD:  03/01/2018 18:26:17

DT:  03/01/2018 20:17:39

Job # [id number]

[id number]

## 2018-03-02 NOTE — CP.CCUPN
CCU Subjective





- Physician Review


Subjective (Free Text): 


Underwent ERCP this AM, denies any new pain nor increase in abd discomfort, no 

fevers or chills. Remains NPO, on RL 500ml/hr. U/O 2000ml overnight, and total I

/Os= +5.1 L. 





Other VS and I/Os reviewed. 





ROS:  No other pertinent negs or positives on 10+  system review. 





PMSFH:    All other Nursing and physician documentation reviewed to date; no 

new pertinent info noted relevant to current medical problems.








CXR: very slight left basilar interstitial changes, no effusion nor 

consolidation.





MRCP: + gallstone pancreatitis, with stone in mid CBD. Large L retroperitoneal 

Lymph node. 








EXAM-


HEENT: no icterus


NECK: short neck, obese anatomy, unable to visualize any JVD, otherwise supple


CHEST: decreased BS bases, no wheezes audible


HEART:  regular distant,  S1S2, no murmur audible, no rubs.


ABD:  soft,  obese, no distention, no tympany, no palp tenderness, BS hypoactive


EXT:   No peripheral/ digital cyanosis, no calf tenderness or palpable cords, 

distal pulses intact and symmetrical. 


NEURO:  no gross focal motor deficits 


SKIN:   no rashes





LABS: 


WBC= 9.0


HGB= 9.4


PLTs=  373K 





Na= 137


K= 3.4


HCO3=27


CL=  102


BUN/Cr=  7/0.5


BS=  85


Lipid studies normal


AST= 128 from 565


ALT= 257 from 418


TBili= 3.6








MAJOR PROBLEMS:





1.   Acute Pancreatitis    BISAP = 1.


         Would stop empiric abx coverage.  


         GI eval for appropriateness of ERCP.  


         Continue aggressive IVF hydration with RL. Lactate is normal today. 


         Replete K, check Mag, Phos. 





2.  h/o Left Pelvis Mass, s/p LSO   GYN-Onc eval.





CCU Objective





- Vital Signs / Intake & Output


Vital Signs (Last 4 hours): 


Vital Signs











  Temp Pulse Resp BP Pulse Ox


 


 03/02/18 10:08  99.3 F  86  14  141/59 L  100


 


 03/02/18 09:00  99.1 F  99 H  23  125/84  99


 


 03/02/18 08:00  99.4 F  91 H  21  116/69  100











Intake and Output (Last 8hrs): 


 Intake & Output











 03/01/18 03/02/18 03/02/18





 22:59 06:59 14:59


 


Intake Total 1000 4100 0


 


Output Total 600 1400 


 


Balance 400 2700 0


 


Intake:   


 


  IV 1000 4000 0


 


  Intake, Piggyback  100 


 


  Oral   0


 


Output:   


 


  Urine 600 1400 


 


    Urethral (Crook) 600 1400 


 


  Stool 0 0 


 


Other:   


 


  # Bowel Movements 0 0 














- Physical Exam


Head: Positive for: Normocephalic


Pupils: Positive for: PERRL


Extroacular Muscles: Positive for: EOMI


Conjunctiva: Positive for: Normal.  Negative for: Icteric


Mouth: Positive for: Moist Mucous Membranes


Neck: Negative for: JVD


Respiratory/Chest: Positive for: Clear to Auscultation


Cardiovascular: Positive for: Regular Rate and Rhythm, Tachycardic.  Negative 

for: Murmurs, Rub


Abdomen: Positive for: Normal Bowel Sounds.  Negative for: Tenderness, 

Distention, Mass/Organomegaly


Lower Extremity: Positive for: NORMAL PULSES.  Negative for: Edema, CALF 

TENDERNESS, Cyanosis


Neurological: Positive for: Motor Func Grossly Intact, Normal Sensory Function


Skin: Positive for: Warm, Dry.  Negative for: Rashes


Psychiatric: Positive for: Alert, Oriented x 3





- Medications


Active Medications: 


Active Medications











Generic Name Dose Route Start Last Admin





  Trade Name Freq  PRN Reason Stop Dose Admin


 


Hydromorphone HCl  1 mg  03/01/18 13:17  03/01/18 21:38





  Dilaudid  IVP   1 mg





  Q6 PRN   Administration





  Pain, severe (8-10)   


 


Hydromorphone HCl  0.5 mg  03/01/18 13:22  03/02/18 08:06





  Dilaudid  IVP  03/03/18 13:23  0.5 mg





  Q4 PRN   Administration





  Pain, moderate (4-7)   


 


Piperacillin Sod/Tazobactam  100 mls @ 100 mls/hr  03/01/18 21:00  03/02/18 08:

15





  Sod 3.375 gm/ Sodium Chloride  IVPB   100 mls/hr





  Q12 FAUZIA   Administration





  Protocol   


 


Lactated Ringer's  1,000 mls @ 500 mls/hr  03/01/18 17:38  03/02/18 08:09





  Lactated Ringer's  IV   500 mls/hr





  .Q2H FAUZIA   Administration


 


Ondansetron HCl  4 mg  03/01/18 13:17  





  Zofran Inj  IVP   





  Q6 PRN   





  Nausea/Vomiting   














- Patient Studies


Lab Studies: 


 Microbiology Studies











 03/01/18 10:35 Urine Culture - Final





 Urine,Clean Catch    No Growth (<1,000 CFU/ML)








 Lab Studies











  03/02/18 03/02/18 03/02/18 Range/Units





  09:45 04:18 04:18 


 


WBC     (4.8-10.8)  K/uL


 


RBC     (3.80-5.20)  Mil/uL


 


Hgb     (12.0-16.0)  g/dL


 


Hct     (34.0-47.0)  %


 


MCV     (81.0-99.0)  fl


 


MCH     (27.0-31.0)  pg


 


MCHC     (33.0-37.0)  g/dL


 


RDW     (11.5-14.5)  %


 


Plt Count     (130-400)  K/uL


 


MPV     (7.2-11.7)  fl


 


Neut % (Auto)     (50.0-75.0)  %


 


Lymph % (Auto)     (20.0-40.0)  %


 


Mono % (Auto)     (0.0-10.0)  %


 


Eos % (Auto)     (0.0-4.0)  %


 


Baso % (Auto)     (0.0-2.0)  %


 


Neut # (Auto)     (1.8-7.0)  K/uL


 


Lymph # (Auto)     (1.0-4.3)  K/uL


 


Mono # (Auto)     (0.0-0.8)  K/uL


 


Eos # (Auto)     (0.0-0.7)  K/uL


 


Baso # (Auto)     (0.0-0.2)  K/uL


 


Neutrophils % (Manual)     (42-75)  %


 


Band Neutrophils %     (0-2)  %


 


Lymphocytes % (Manual)     (20-50)  %


 


Monocytes % (Manual)     (0-10)  %


 


Toxic Granulation     


 


Platelet Estimate     (NORMAL)  


 


Large Platelets     


 


Hypochromasia (manual)     


 


Anisocytosis (manual)     


 


Microcytosis (manual)     


 


Sodium    137  (132-148)  mmol/l


 


Potassium    3.4 L  (3.6-5.0)  MMOL/L


 


Chloride    102  ()  mmol/L


 


Carbon Dioxide    27  (22-30)  mmol/L


 


Anion Gap    11  (10-20)  


 


BUN    7  (7-17)  mg/dl


 


Creatinine    0.5 L  (0.7-1.2)  mg/dl


 


Est GFR (African Amer)    > 60  


 


Est GFR (Non-Af Amer)    > 60  


 


POC Glucose (mg/dL)     ()  mg/dL


 


Random Glucose    85  ()  mg/dL


 


Lactic Acid   0.8   (0.7-2.1)  MMOL/L


 


Calcium    8.2 L  (8.4-10.2)  mg/dL


 


Total Bilirubin    3.6 H  (0.2-1.3)  mg/dl


 


AST    128 H D  (14-36)  U/L


 


ALT    257 H D  (9-52)  U/L


 


Alkaline Phosphatase    190 H  ()  U/L


 


Total Protein    5.9 L  (6.3-8.2)  G/DL


 


Albumin    2.8 L D  (3.5-5.0)  g/dL


 


Globulin    3.1  (2.2-3.9)  gm/dL


 


Albumin/Globulin Ratio    0.9 L  (1.0-2.1)  


 


Triglycerides     (0-149)  mg/DL


 


Cholesterol     (0-199)  mg/dL


 


LDL Cholesterol Direct     (0-129)  mg/dL


 


HDL Cholesterol     (30-70)  MG/DL


 


Amylase  629 H    ()  U/L


 


Lipase  2357 H    ()  U/L


 


Urine Color     (YELLOW)  


 


Urine Clarity     (Clear)  


 


Urine pH     (5.0-8.0)  


 


Ur Specific Gravity     (1.003-1.030)  


 


Urine Protein     (NEGATIVE)  mg/dL


 


Urine Glucose (UA)     (Normal)  mg/dL


 


Urine Ketones     (NEGATIVE)  mg/dL


 


Urine Blood     (NEGATIVE)  


 


Urine Nitrate     (NEGATIVE)  


 


Urine Bilirubin     (NEGATIVE)  


 


Urine Urobilinogen     (0.2-1.0)  mg/dL


 


Ur Leukocyte Esterase     (Negative)  Matt/uL


 


Urine RBC (Auto)     (0-3)  /hpf


 


Urine Microscopic WBC     (0-5)  /hpf


 


Ur Squamous Epith Cells     (0-5)  /hpf


 


Urine Bacteria     (<OCC)  


 


Blood Type     


 


Antibody Screen     


 


BBK History Checked     














  03/02/18 03/01/18 03/01/18 Range/Units





  04:18 19:05 17:48 


 


WBC  9.0  D    (4.8-10.8)  K/uL


 


RBC  4.21    (3.80-5.20)  Mil/uL


 


Hgb  9.4 L    (12.0-16.0)  g/dL


 


Hct  29.5 L    (34.0-47.0)  %


 


MCV  70.1 L    (81.0-99.0)  fl


 


MCH  22.3 L    (27.0-31.0)  pg


 


MCHC  31.9 L    (33.0-37.0)  g/dL


 


RDW  16.3 H    (11.5-14.5)  %


 


Plt Count  373    (130-400)  K/uL


 


MPV  8.9    (7.2-11.7)  fl


 


Neut % (Auto)  86.2 H    (50.0-75.0)  %


 


Lymph % (Auto)  7.9 L    (20.0-40.0)  %


 


Mono % (Auto)  4.8    (0.0-10.0)  %


 


Eos % (Auto)  0.8    (0.0-4.0)  %


 


Baso % (Auto)  0.3    (0.0-2.0)  %


 


Neut # (Auto)  7.8 H    (1.8-7.0)  K/uL


 


Lymph # (Auto)  0.7 L    (1.0-4.3)  K/uL


 


Mono # (Auto)  0.4    (0.0-0.8)  K/uL


 


Eos # (Auto)  0.1    (0.0-0.7)  K/uL


 


Baso # (Auto)  0.0    (0.0-0.2)  K/uL


 


Neutrophils % (Manual)     (42-75)  %


 


Band Neutrophils %     (0-2)  %


 


Lymphocytes % (Manual)     (20-50)  %


 


Monocytes % (Manual)     (0-10)  %


 


Toxic Granulation     


 


Platelet Estimate     (NORMAL)  


 


Large Platelets     


 


Hypochromasia (manual)     


 


Anisocytosis (manual)     


 


Microcytosis (manual)     


 


Sodium     (132-148)  mmol/l


 


Potassium     (3.6-5.0)  MMOL/L


 


Chloride     ()  mmol/L


 


Carbon Dioxide     (22-30)  mmol/L


 


Anion Gap     (10-20)  


 


BUN     (7-17)  mg/dl


 


Creatinine     (0.7-1.2)  mg/dl


 


Est GFR (African Amer)     


 


Est GFR (Non-Af Amer)     


 


POC Glucose (mg/dL)    93  ()  mg/dL


 


Random Glucose     ()  mg/dL


 


Lactic Acid     (0.7-2.1)  MMOL/L


 


Calcium     (8.4-10.2)  mg/dL


 


Total Bilirubin     (0.2-1.3)  mg/dl


 


AST     (14-36)  U/L


 


ALT     (9-52)  U/L


 


Alkaline Phosphatase     ()  U/L


 


Total Protein     (6.3-8.2)  G/DL


 


Albumin     (3.5-5.0)  g/dL


 


Globulin     (2.2-3.9)  gm/dL


 


Albumin/Globulin Ratio     (1.0-2.1)  


 


Triglycerides     (0-149)  mg/DL


 


Cholesterol     (0-199)  mg/dL


 


LDL Cholesterol Direct     (0-129)  mg/dL


 


HDL Cholesterol     (30-70)  MG/DL


 


Amylase     ()  U/L


 


Lipase     ()  U/L


 


Urine Color   Yellow   (YELLOW)  


 


Urine Clarity   Slighty-cloudy   (Clear)  


 


Urine pH   7.0   (5.0-8.0)  


 


Ur Specific Gravity   1.010   (1.003-1.030)  


 


Urine Protein   Negative   (NEGATIVE)  mg/dL


 


Urine Glucose (UA)   Neg   (Normal)  mg/dL


 


Urine Ketones   Negative   (NEGATIVE)  mg/dL


 


Urine Blood   Negative   (NEGATIVE)  


 


Urine Nitrate   Negative   (NEGATIVE)  


 


Urine Bilirubin   Negative   (NEGATIVE)  


 


Urine Urobilinogen   0.2-1.0   (0.2-1.0)  mg/dL


 


Ur Leukocyte Esterase   Neg   (Negative)  Matt/uL


 


Urine RBC (Auto)   2   (0-3)  /hpf


 


Urine Microscopic WBC   < 1   (0-5)  /hpf


 


Ur Squamous Epith Cells   < 1   (0-5)  /hpf


 


Urine Bacteria   Rare   (<OCC)  


 


Blood Type     


 


Antibody Screen     


 


BBK History Checked     














  03/01/18 03/01/18 03/01/18 Range/Units





  11:05 10:35 10:10 


 


WBC     (4.8-10.8)  K/uL


 


RBC     (3.80-5.20)  Mil/uL


 


Hgb     (12.0-16.0)  g/dL


 


Hct     (34.0-47.0)  %


 


MCV     (81.0-99.0)  fl


 


MCH     (27.0-31.0)  pg


 


MCHC     (33.0-37.0)  g/dL


 


RDW     (11.5-14.5)  %


 


Plt Count     (130-400)  K/uL


 


MPV     (7.2-11.7)  fl


 


Neut % (Auto)     (50.0-75.0)  %


 


Lymph % (Auto)     (20.0-40.0)  %


 


Mono % (Auto)     (0.0-10.0)  %


 


Eos % (Auto)     (0.0-4.0)  %


 


Baso % (Auto)     (0.0-2.0)  %


 


Neut # (Auto)     (1.8-7.0)  K/uL


 


Lymph # (Auto)     (1.0-4.3)  K/uL


 


Mono # (Auto)     (0.0-0.8)  K/uL


 


Eos # (Auto)     (0.0-0.7)  K/uL


 


Baso # (Auto)     (0.0-0.2)  K/uL


 


Neutrophils % (Manual)     (42-75)  %


 


Band Neutrophils %     (0-2)  %


 


Lymphocytes % (Manual)     (20-50)  %


 


Monocytes % (Manual)     (0-10)  %


 


Toxic Granulation     


 


Platelet Estimate     (NORMAL)  


 


Large Platelets     


 


Hypochromasia (manual)     


 


Anisocytosis (manual)     


 


Microcytosis (manual)     


 


Sodium    141  (132-148)  mmol/l


 


Potassium    4.4  (3.6-5.0)  MMOL/L


 


Chloride    104  ()  mmol/L


 


Carbon Dioxide    22  (22-30)  mmol/L


 


Anion Gap    19  (10-20)  


 


BUN    17  (7-17)  mg/dl


 


Creatinine    0.5 L  (0.7-1.2)  mg/dl


 


Est GFR (African Amer)    > 60  


 


Est GFR (Non-Af Amer)    > 60  


 


POC Glucose (mg/dL)     ()  mg/dL


 


Random Glucose    86  ()  mg/dL


 


Lactic Acid     (0.7-2.1)  MMOL/L


 


Calcium    8.7  (8.4-10.2)  mg/dL


 


Total Bilirubin    1.3  (0.2-1.3)  mg/dl


 


AST    565 H D  (14-36)  U/L


 


ALT    418 H D  (9-52)  U/L


 


Alkaline Phosphatase    203 H D  ()  U/L


 


Total Protein    7.6  (6.3-8.2)  G/DL


 


Albumin    3.8  (3.5-5.0)  g/dL


 


Globulin    3.8  (2.2-3.9)  gm/dL


 


Albumin/Globulin Ratio    1.0  (1.0-2.1)  


 


Triglycerides    94  (0-149)  mg/DL


 


Cholesterol    169  (0-199)  mg/dL


 


LDL Cholesterol Direct    69  (0-129)  mg/dL


 


HDL Cholesterol    50  (30-70)  MG/DL


 


Amylase     ()  U/L


 


Lipase    61488 H  ()  U/L


 


Urine Color   Halle   (YELLOW)  


 


Urine Clarity   Slighty-cloudy   (Clear)  


 


Urine pH   5.0   (5.0-8.0)  


 


Ur Specific Gravity   1.035 H   (1.003-1.030)  


 


Urine Protein   30   (NEGATIVE)  mg/dL


 


Urine Glucose (UA)   Neg   (Normal)  mg/dL


 


Urine Ketones   Negative   (NEGATIVE)  mg/dL


 


Urine Blood   Negative   (NEGATIVE)  


 


Urine Nitrate   Negative   (NEGATIVE)  


 


Urine Bilirubin   Negative   (NEGATIVE)  


 


Urine Urobilinogen   0.2-1.0   (0.2-1.0)  mg/dL


 


Ur Leukocyte Esterase   Neg   (Negative)  Matt/uL


 


Urine RBC (Auto)   3   (0-3)  /hpf


 


Urine Microscopic WBC   3   (0-5)  /hpf


 


Ur Squamous Epith Cells   5   (0-5)  /hpf


 


Urine Bacteria     (<OCC)  


 


Blood Type  O POSITIVE    


 


Antibody Screen  Negative    


 


BBK History Checked  Patient has bt    














  03/01/18 Range/Units





  10:10 


 


WBC   (4.8-10.8)  K/uL


 


RBC   (3.80-5.20)  Mil/uL


 


Hgb   (12.0-16.0)  g/dL


 


Hct   (34.0-47.0)  %


 


MCV   (81.0-99.0)  fl


 


MCH   (27.0-31.0)  pg


 


MCHC   (33.0-37.0)  g/dL


 


RDW   (11.5-14.5)  %


 


Plt Count   (130-400)  K/uL


 


MPV   (7.2-11.7)  fl


 


Neut % (Auto)   (50.0-75.0)  %


 


Lymph % (Auto)   (20.0-40.0)  %


 


Mono % (Auto)   (0.0-10.0)  %


 


Eos % (Auto)   (0.0-4.0)  %


 


Baso % (Auto)   (0.0-2.0)  %


 


Neut # (Auto)   (1.8-7.0)  K/uL


 


Lymph # (Auto)   (1.0-4.3)  K/uL


 


Mono # (Auto)   (0.0-0.8)  K/uL


 


Eos # (Auto)   (0.0-0.7)  K/uL


 


Baso # (Auto)   (0.0-0.2)  K/uL


 


Neutrophils % (Manual)  92 H  (42-75)  %


 


Band Neutrophils %  1  (0-2)  %


 


Lymphocytes % (Manual)  6 L  (20-50)  %


 


Monocytes % (Manual)  1  (0-10)  %


 


Toxic Granulation  Present  


 


Platelet Estimate  Slightly increased H  (NORMAL)  


 


Large Platelets  Present  


 


Hypochromasia (manual)  Slight  


 


Anisocytosis (manual)  Slight  


 


Microcytosis (manual)  Slight  


 


Sodium   (132-148)  mmol/l


 


Potassium   (3.6-5.0)  MMOL/L


 


Chloride   ()  mmol/L


 


Carbon Dioxide   (22-30)  mmol/L


 


Anion Gap   (10-20)  


 


BUN   (7-17)  mg/dl


 


Creatinine   (0.7-1.2)  mg/dl


 


Est GFR (African Amer)   


 


Est GFR (Non-Af Amer)   


 


POC Glucose (mg/dL)   ()  mg/dL


 


Random Glucose   ()  mg/dL


 


Lactic Acid   (0.7-2.1)  MMOL/L


 


Calcium   (8.4-10.2)  mg/dL


 


Total Bilirubin   (0.2-1.3)  mg/dl


 


AST   (14-36)  U/L


 


ALT   (9-52)  U/L


 


Alkaline Phosphatase   ()  U/L


 


Total Protein   (6.3-8.2)  G/DL


 


Albumin   (3.5-5.0)  g/dL


 


Globulin   (2.2-3.9)  gm/dL


 


Albumin/Globulin Ratio   (1.0-2.1)  


 


Triglycerides   (0-149)  mg/DL


 


Cholesterol   (0-199)  mg/dL


 


LDL Cholesterol Direct   (0-129)  mg/dL


 


HDL Cholesterol   (30-70)  MG/DL


 


Amylase   ()  U/L


 


Lipase   ()  U/L


 


Urine Color   (YELLOW)  


 


Urine Clarity   (Clear)  


 


Urine pH   (5.0-8.0)  


 


Ur Specific Gravity   (1.003-1.030)  


 


Urine Protein   (NEGATIVE)  mg/dL


 


Urine Glucose (UA)   (Normal)  mg/dL


 


Urine Ketones   (NEGATIVE)  mg/dL


 


Urine Blood   (NEGATIVE)  


 


Urine Nitrate   (NEGATIVE)  


 


Urine Bilirubin   (NEGATIVE)  


 


Urine Urobilinogen   (0.2-1.0)  mg/dL


 


Ur Leukocyte Esterase   (Negative)  Matt/uL


 


Urine RBC (Auto)   (0-3)  /hpf


 


Urine Microscopic WBC   (0-5)  /hpf


 


Ur Squamous Epith Cells   (0-5)  /hpf


 


Urine Bacteria   (<OCC)  


 


Blood Type   


 


Antibody Screen   


 


BBK History Checked   








 Laboratory Results - last 24 hr











  03/01/18 03/01/18 03/01/18





  10:10 10:10 10:35


 


WBC   


 


RBC   


 


Hgb   


 


Hct   


 


MCV   


 


MCH   


 


MCHC   


 


RDW   


 


Plt Count   


 


MPV   


 


Neut % (Auto)   


 


Lymph % (Auto)   


 


Mono % (Auto)   


 


Eos % (Auto)   


 


Baso % (Auto)   


 


Neut # (Auto)   


 


Lymph # (Auto)   


 


Mono # (Auto)   


 


Eos # (Auto)   


 


Baso # (Auto)   


 


Neutrophils % (Manual)  92 H  


 


Band Neutrophils %  1  


 


Lymphocytes % (Manual)  6 L  


 


Monocytes % (Manual)  1  


 


Toxic Granulation  Present  


 


Platelet Estimate  Slightly increased H  


 


Large Platelets  Present  


 


Hypochromasia (manual)  Slight  


 


Anisocytosis (manual)  Slight  


 


Microcytosis (manual)  Slight  


 


Sodium   141 


 


Potassium   4.4 


 


Chloride   104 


 


Carbon Dioxide   22 


 


Anion Gap   19 


 


BUN   17 


 


Creatinine   0.5 L 


 


Est GFR ( Amer)   > 60 


 


Est GFR (Non-Af Amer)   > 60 


 


POC Glucose (mg/dL)   


 


Random Glucose   86 


 


Lactic Acid   


 


Calcium   8.7 


 


Total Bilirubin   1.3 


 


AST   565 H D 


 


ALT   418 H D 


 


Alkaline Phosphatase   203 H D 


 


Total Protein   7.6 


 


Albumin   3.8 


 


Globulin   3.8 


 


Albumin/Globulin Ratio   1.0 


 


Triglycerides   94 


 


Cholesterol   169 


 


LDL Cholesterol Direct   69 


 


HDL Cholesterol   50 


 


Amylase   


 


Lipase   80296 H 


 


Urine Color    Halle


 


Urine Clarity    Slighty-cloudy


 


Urine pH    5.0


 


Ur Specific Gravity    1.035 H


 


Urine Protein    30


 


Urine Glucose (UA)    Neg


 


Urine Ketones    Negative


 


Urine Blood    Negative


 


Urine Nitrate    Negative


 


Urine Bilirubin    Negative


 


Urine Urobilinogen    0.2-1.0


 


Ur Leukocyte Esterase    Neg


 


Urine RBC (Auto)    3


 


Urine Microscopic WBC    3


 


Ur Squamous Epith Cells    5


 


Urine Bacteria   


 


Blood Type   


 


Antibody Screen   


 


BBK History Checked   














  03/01/18 03/01/18 03/01/18





  11:05 17:48 19:05


 


WBC   


 


RBC   


 


Hgb   


 


Hct   


 


MCV   


 


MCH   


 


MCHC   


 


RDW   


 


Plt Count   


 


MPV   


 


Neut % (Auto)   


 


Lymph % (Auto)   


 


Mono % (Auto)   


 


Eos % (Auto)   


 


Baso % (Auto)   


 


Neut # (Auto)   


 


Lymph # (Auto)   


 


Mono # (Auto)   


 


Eos # (Auto)   


 


Baso # (Auto)   


 


Neutrophils % (Manual)   


 


Band Neutrophils %   


 


Lymphocytes % (Manual)   


 


Monocytes % (Manual)   


 


Toxic Granulation   


 


Platelet Estimate   


 


Large Platelets   


 


Hypochromasia (manual)   


 


Anisocytosis (manual)   


 


Microcytosis (manual)   


 


Sodium   


 


Potassium   


 


Chloride   


 


Carbon Dioxide   


 


Anion Gap   


 


BUN   


 


Creatinine   


 


Est GFR ( Amer)   


 


Est GFR (Non-Af Amer)   


 


POC Glucose (mg/dL)   93 


 


Random Glucose   


 


Lactic Acid   


 


Calcium   


 


Total Bilirubin   


 


AST   


 


ALT   


 


Alkaline Phosphatase   


 


Total Protein   


 


Albumin   


 


Globulin   


 


Albumin/Globulin Ratio   


 


Triglycerides   


 


Cholesterol   


 


LDL Cholesterol Direct   


 


HDL Cholesterol   


 


Amylase   


 


Lipase   


 


Urine Color    Yellow


 


Urine Clarity    Slighty-cloudy


 


Urine pH    7.0


 


Ur Specific Gravity    1.010


 


Urine Protein    Negative


 


Urine Glucose (UA)    Neg


 


Urine Ketones    Negative


 


Urine Blood    Negative


 


Urine Nitrate    Negative


 


Urine Bilirubin    Negative


 


Urine Urobilinogen    0.2-1.0


 


Ur Leukocyte Esterase    Neg


 


Urine RBC (Auto)    2


 


Urine Microscopic WBC    < 1


 


Ur Squamous Epith Cells    < 1


 


Urine Bacteria    Rare


 


Blood Type  O POSITIVE  


 


Antibody Screen  Negative  


 


BBK History Checked  Patient has bt  














  03/02/18 03/02/18 03/02/18





  04:18 04:18 04:18


 


WBC  9.0  D  


 


RBC  4.21  


 


Hgb  9.4 L  


 


Hct  29.5 L  


 


MCV  70.1 L  


 


MCH  22.3 L  


 


MCHC  31.9 L  


 


RDW  16.3 H  


 


Plt Count  373  


 


MPV  8.9  


 


Neut % (Auto)  86.2 H  


 


Lymph % (Auto)  7.9 L  


 


Mono % (Auto)  4.8  


 


Eos % (Auto)  0.8  


 


Baso % (Auto)  0.3  


 


Neut # (Auto)  7.8 H  


 


Lymph # (Auto)  0.7 L  


 


Mono # (Auto)  0.4  


 


Eos # (Auto)  0.1  


 


Baso # (Auto)  0.0  


 


Neutrophils % (Manual)   


 


Band Neutrophils %   


 


Lymphocytes % (Manual)   


 


Monocytes % (Manual)   


 


Toxic Granulation   


 


Platelet Estimate   


 


Large Platelets   


 


Hypochromasia (manual)   


 


Anisocytosis (manual)   


 


Microcytosis (manual)   


 


Sodium   137 


 


Potassium   3.4 L 


 


Chloride   102 


 


Carbon Dioxide   27 


 


Anion Gap   11 


 


BUN   7 


 


Creatinine   0.5 L 


 


Est GFR ( Amer)   > 60 


 


Est GFR (Non-Af Amer)   > 60 


 


POC Glucose (mg/dL)   


 


Random Glucose   85 


 


Lactic Acid    0.8


 


Calcium   8.2 L 


 


Total Bilirubin   3.6 H 


 


AST   128 H D 


 


ALT   257 H D 


 


Alkaline Phosphatase   190 H 


 


Total Protein   5.9 L 


 


Albumin   2.8 L D 


 


Globulin   3.1 


 


Albumin/Globulin Ratio   0.9 L 


 


Triglycerides   


 


Cholesterol   


 


LDL Cholesterol Direct   


 


HDL Cholesterol   


 


Amylase   


 


Lipase   


 


Urine Color   


 


Urine Clarity   


 


Urine pH   


 


Ur Specific Gravity   


 


Urine Protein   


 


Urine Glucose (UA)   


 


Urine Ketones   


 


Urine Blood   


 


Urine Nitrate   


 


Urine Bilirubin   


 


Urine Urobilinogen   


 


Ur Leukocyte Esterase   


 


Urine RBC (Auto)   


 


Urine Microscopic WBC   


 


Ur Squamous Epith Cells   


 


Urine Bacteria   


 


Blood Type   


 


Antibody Screen   


 


BBK History Checked   














  03/02/18





  09:45


 


WBC 


 


RBC 


 


Hgb 


 


Hct 


 


MCV 


 


MCH 


 


MCHC 


 


RDW 


 


Plt Count 


 


MPV 


 


Neut % (Auto) 


 


Lymph % (Auto) 


 


Mono % (Auto) 


 


Eos % (Auto) 


 


Baso % (Auto) 


 


Neut # (Auto) 


 


Lymph # (Auto) 


 


Mono # (Auto) 


 


Eos # (Auto) 


 


Baso # (Auto) 


 


Neutrophils % (Manual) 


 


Band Neutrophils % 


 


Lymphocytes % (Manual) 


 


Monocytes % (Manual) 


 


Toxic Granulation 


 


Platelet Estimate 


 


Large Platelets 


 


Hypochromasia (manual) 


 


Anisocytosis (manual) 


 


Microcytosis (manual) 


 


Sodium 


 


Potassium 


 


Chloride 


 


Carbon Dioxide 


 


Anion Gap 


 


BUN 


 


Creatinine 


 


Est GFR ( Amer) 


 


Est GFR (Non-Af Amer) 


 


POC Glucose (mg/dL) 


 


Random Glucose 


 


Lactic Acid 


 


Calcium 


 


Total Bilirubin 


 


AST 


 


ALT 


 


Alkaline Phosphatase 


 


Total Protein 


 


Albumin 


 


Globulin 


 


Albumin/Globulin Ratio 


 


Triglycerides 


 


Cholesterol 


 


LDL Cholesterol Direct 


 


HDL Cholesterol 


 


Amylase  629 H


 


Lipase  2357 H


 


Urine Color 


 


Urine Clarity 


 


Urine pH 


 


Ur Specific Gravity 


 


Urine Protein 


 


Urine Glucose (UA) 


 


Urine Ketones 


 


Urine Blood 


 


Urine Nitrate 


 


Urine Bilirubin 


 


Urine Urobilinogen 


 


Ur Leukocyte Esterase 


 


Urine RBC (Auto) 


 


Urine Microscopic WBC 


 


Ur Squamous Epith Cells 


 


Urine Bacteria 


 


Blood Type 


 


Antibody Screen 


 


BBK History Checked 














Review of Systems





- Review of Systems


All systems: reviewed and no additional remarkable complaints except (as above)





Critical Care Progress Note





- Nutrition


Nutrition: 


 Nutrition











 Category Date Time Status


 


 NPO Diet [DIET] Diets  03/01/18 Breakfast Active

## 2018-03-02 NOTE — CON
DATE:



HISTORY OF PRESENT ILLNESS:  The patient is seen in the ICU at [facility] on

Thursday evening.  She seemed to be 19-year-old female who recently was in

the hospital with a workup showing a gallbladder without stones.  Surgical

intervention was deemed not necessary.  The patient is admitted now with

abdominal pain, mostly right upper quadrant, epigastric without change in

the urine.  Without nausea, vomiting, diarrhea, constipation, bright red

blood per rectum or melena.



PAST SURGICAL HISTORY:  Remarkable for a uterine fibroid and left-sided

dysgerminoma.  This was done in the late summer.



ALLERGIES:  NONE.



REVIEW OF SYSTEMS:  No chest pain or shortness of breath.



MEDICATION:  No prior medicines.



ALLERGIES:  No allergies.



REVIEW OF SYSTEMS:  No chest pain or shortness of breath.



MEDICATION:  No prior medicines.



PHYSICAL EXAMINATION:

VITAL SIGNS: The patient admitted with vital signs that are normal,

afebrile, pulse about 100, blood pressure between 100 and 130, and weight

is 244 pounds.

GASTROINTESTINAL:  The abdomen is a little bit tender without guarding or

rebound.



LABORATORY DATA:  White count on admission is 19.2.  Coags are normal. 

Bilirubin is normal.  Alkaline phosphatase is 203, AST is 500, and ALT is

418.  Lipase is 19,000.  Abdominal ultrasound; I cannot see the films

myself because of issues with the computer.  Ultrasound showed dilated

biliary ducts up to 18 mm.  MRCP is done last night, the report and the

films are not available.



ASSESSMENT AND PLAN:  Discussed with gastrointestinal and consult. 

Magnetic resonance cholangiopancreatography is planned.  My feeling is this

sounds like gallstone pancreatitis.  An magnetic resonance

cholangiopancreatography is warranted.  Discussion with Gastrointestinal

whether or not to do an endoscopic retrograde cholangiopancreatography or

not.  At some point, I would like to get the CAT scan just to look at the

pancreatitis.  We will reevaluate in the morning.







__________________________________________

Rome Victor MD





DD:  03/02/2018 5:22:11

DT:  03/02/2018 10:46:05

Job # [id number]

## 2018-03-03 NOTE — CP.CCUPN
CCU Subjective





- Physician Review


Events Since Last Encounter (Free Text): 





03/03/18 07:25


Patient awake, no distress, no abdominal pain, no vomiting, no pressors, follow 

commands, events reviewed





CCU Objective





- Vital Signs / Intake & Output


Vital Signs (Last 4 hours): 


Vital Signs











  Temp Pulse Resp BP Pulse Ox


 


 03/03/18 07:00   80  19  141/93 H  100


 


 03/03/18 06:00   72  21  134/85  99


 


 03/03/18 05:00   87  15  125/93 H  98


 


 03/03/18 04:00  97.9 F  65  17  99/69 L  98











Intake and Output (Last 8hrs): 


 Intake & Output











 03/02/18 03/03/18 03/03/18





 22:59 06:59 14:59


 


Intake Total 4320 2500 


 


Output Total 2610 1650 


 


Balance 1710 850 


 


Intake:   


 


  IV 3600 2400 


 


  Intake, Piggyback  100 


 


  Oral 720  


 


Output:   


 


  Urine 2610 1650 


 


    Urethral (Crook) 2160  


 


    Urine, Voided 450 1650 














- Physical Exam


Head: Positive for: Normocephalic


Pupils: Positive for: PERRL


Extroacular Muscles: Positive for: EOMI


Conjunctiva: Positive for: Normal.  Negative for: Icteric


Mouth: Positive for: Moist Mucous Membranes


Neck: Negative for: JVD


Respiratory/Chest: Positive for: Clear to Auscultation


Cardiovascular: Positive for: Regular Rate and Rhythm.  Negative for: Murmurs, 

Rub


Abdomen: Positive for: Normal Bowel Sounds.  Negative for: Tenderness, 

Distention, Mass/Organomegaly


Lower Extremity: Positive for: NORMAL PULSES.  Negative for: Edema, CALF 

TENDERNESS, Cyanosis


Neurological: Positive for: Motor Func Grossly Intact, Normal Sensory Function


Skin: Positive for: Warm, Dry.  Negative for: Rashes


Psychiatric: Positive for: Alert, Oriented x 3





- Medications


Active Medications: 


Active Medications











Generic Name Dose Route Start Last Admin





  Trade Name Freq  PRN Reason Stop Dose Admin


 


Famotidine  20 mg  03/03/18 09:00  





  Pepcid  IVP   





  DAILY FAUZIA   


 


Heparin Sodium (Porcine)  5,000 units  03/02/18 21:00  03/02/18 21:25





  Heparin  SC   5,000 units





  Q12 FAUZIA   Administration





  Protocol   


 


Hydromorphone HCl  1 mg  03/01/18 13:17  03/01/18 21:38





  Dilaudid  IVP   1 mg





  Q6 PRN   Administration





  Pain, severe (8-10)   


 


Hydromorphone HCl  0.5 mg  03/01/18 13:22  03/03/18 00:33





  Dilaudid  IVP  03/03/18 13:23  0.5 mg





  Q4 PRN   Administration





  Pain, moderate (4-7)   


 


Piperacillin Sod/Tazobactam  100 mls @ 100 mls/hr  03/01/18 21:00  03/02/18 21:

25





  Sod 3.375 gm/ Sodium Chloride  IVPB   100 mls/hr





  Q12 FAUZIA   Administration





  Protocol   


 


Lactated Ringer's  1,000 mls @ 300 mls/hr  03/02/18 20:00  03/03/18 06:22





  Lactated Ringer's  IV   300 mls/hr





  .Q3H20M FAUZIA   Administration


 


Ondansetron HCl  4 mg  03/01/18 13:17  





  Zofran Inj  IVP   





  Q6 PRN   





  Nausea/Vomiting   














- Patient Studies


Lab Studies: 


 Microbiology Studies











 03/01/18 11:00 Blood Culture - Preliminary





 Blood-Venous    NO GROWTH AFTER 24 HOURS


 


 03/01/18 10:35 Urine Culture - Final





 Urine,Clean Catch    No Growth (<1,000 CFU/ML)








 Lab Studies











  03/03/18 03/03/18 03/02/18 Range/Units





  04:20 04:20 18:39 


 


WBC   6.9   (4.8-10.8)  K/uL


 


RBC   3.85   (3.80-5.20)  Mil/uL


 


Hgb   8.7 L   (12.0-16.0)  g/dL


 


Hct   27.3 L   (34.0-47.0)  %


 


MCV   70.9 L   (81.0-99.0)  fl


 


MCH   22.7 L   (27.0-31.0)  pg


 


MCHC   31.9 L   (33.0-37.0)  g/dL


 


RDW   16.6 H   (11.5-14.5)  %


 


Plt Count   347   (130-400)  K/uL


 


MPV   8.2   (7.2-11.7)  fl


 


Neut % (Auto)   70.6   (50.0-75.0)  %


 


Lymph % (Auto)   18.8 L   (20.0-40.0)  %


 


Mono % (Auto)   6.7   (0.0-10.0)  %


 


Eos % (Auto)   3.5   (0.0-4.0)  %


 


Baso % (Auto)   0.4   (0.0-2.0)  %


 


Neut # (Auto)   4.9   (1.8-7.0)  K/uL


 


Lymph # (Auto)   1.3   (1.0-4.3)  K/uL


 


Mono # (Auto)   0.5   (0.0-0.8)  K/uL


 


Eos # (Auto)   0.2   (0.0-0.7)  K/uL


 


Baso # (Auto)   0.0   (0.0-0.2)  K/uL


 


Sodium  142   141  (132-148)  mmol/l


 


Potassium  3.6   3.5 L  (3.6-5.0)  MMOL/L


 


Chloride  106   104  ()  mmol/L


 


Carbon Dioxide  28   29  (22-30)  mmol/L


 


Anion Gap  12   12  (10-20)  


 


BUN  5 L   7  (7-17)  mg/dl


 


Creatinine  0.5 L   0.5 L  (0.7-1.2)  mg/dl


 


Est GFR (African Amer)  > 60   > 60  


 


Est GFR (Non-Af Amer)  > 60   > 60  


 


Random Glucose  90   93  ()  mg/dL


 


Calcium  8.2 L   8.3 L  (8.4-10.2)  mg/dL


 


Total Bilirubin  1.1   2.4 H  (0.2-1.3)  mg/dl


 


AST  51 H D   76 H D  (14-36)  U/L


 


ALT  176 H   207 H  (9-52)  U/L


 


Alkaline Phosphatase  179 H   190 H  ()  U/L


 


Total Protein  5.6 L   5.9 L  (6.3-8.2)  G/DL


 


Albumin  2.7 L   2.8 L  (3.5-5.0)  g/dL


 


Globulin  2.9   3.1  (2.2-3.9)  gm/dL


 


Albumin/Globulin Ratio  0.9 L   0.9 L  (1.0-2.1)  


 


Amylase    456 H D  ()  U/L


 


Lipase  443 H   1124 H  ()  U/L














  03/02/18 03/02/18 Range/Units





  18:39 09:45 


 


WBC  6.2   (4.8-10.8)  K/uL


 


RBC  4.01   (3.80-5.20)  Mil/uL


 


Hgb  9.0 L   (12.0-16.0)  g/dL


 


Hct  28.4 L   (34.0-47.0)  %


 


MCV  70.9 L   (81.0-99.0)  fl


 


MCH  22.5 L   (27.0-31.0)  pg


 


MCHC  31.7 L   (33.0-37.0)  g/dL


 


RDW  16.7 H   (11.5-14.5)  %


 


Plt Count  363   (130-400)  K/uL


 


MPV    (7.2-11.7)  fl


 


Neut % (Auto)    (50.0-75.0)  %


 


Lymph % (Auto)    (20.0-40.0)  %


 


Mono % (Auto)    (0.0-10.0)  %


 


Eos % (Auto)    (0.0-4.0)  %


 


Baso % (Auto)    (0.0-2.0)  %


 


Neut # (Auto)    (1.8-7.0)  K/uL


 


Lymph # (Auto)    (1.0-4.3)  K/uL


 


Mono # (Auto)    (0.0-0.8)  K/uL


 


Eos # (Auto)    (0.0-0.7)  K/uL


 


Baso # (Auto)    (0.0-0.2)  K/uL


 


Sodium    (132-148)  mmol/l


 


Potassium    (3.6-5.0)  MMOL/L


 


Chloride    ()  mmol/L


 


Carbon Dioxide    (22-30)  mmol/L


 


Anion Gap    (10-20)  


 


BUN    (7-17)  mg/dl


 


Creatinine    (0.7-1.2)  mg/dl


 


Est GFR (African Amer)    


 


Est GFR (Non-Af Amer)    


 


Random Glucose    ()  mg/dL


 


Calcium    (8.4-10.2)  mg/dL


 


Total Bilirubin    (0.2-1.3)  mg/dl


 


AST    (14-36)  U/L


 


ALT    (9-52)  U/L


 


Alkaline Phosphatase    ()  U/L


 


Total Protein    (6.3-8.2)  G/DL


 


Albumin    (3.5-5.0)  g/dL


 


Globulin    (2.2-3.9)  gm/dL


 


Albumin/Globulin Ratio    (1.0-2.1)  


 


Amylase   629 H  ()  U/L


 


Lipase   2357 H  ()  U/L








 Laboratory Results - last 24 hr











  03/02/18 03/02/18 03/02/18





  09:45 18:39 18:39


 


WBC   6.2 


 


RBC   4.01 


 


Hgb   9.0 L 


 


Hct   28.4 L 


 


MCV   70.9 L 


 


MCH   22.5 L 


 


MCHC   31.7 L 


 


RDW   16.7 H 


 


Plt Count   363 


 


MPV   


 


Neut % (Auto)   


 


Lymph % (Auto)   


 


Mono % (Auto)   


 


Eos % (Auto)   


 


Baso % (Auto)   


 


Neut # (Auto)   


 


Lymph # (Auto)   


 


Mono # (Auto)   


 


Eos # (Auto)   


 


Baso # (Auto)   


 


Sodium    141


 


Potassium    3.5 L


 


Chloride    104


 


Carbon Dioxide    29


 


Anion Gap    12


 


BUN    7


 


Creatinine    0.5 L


 


Est GFR ( Amer)    > 60


 


Est GFR (Non-Af Amer)    > 60


 


Random Glucose    93


 


Calcium    8.3 L


 


Total Bilirubin    2.4 H


 


AST    76 H D


 


ALT    207 H


 


Alkaline Phosphatase    190 H


 


Total Protein    5.9 L


 


Albumin    2.8 L


 


Globulin    3.1


 


Albumin/Globulin Ratio    0.9 L


 


Amylase  629 H   456 H D


 


Lipase  2357 H   1124 H














  03/03/18 03/03/18





  04:20 04:20


 


WBC  6.9 


 


RBC  3.85 


 


Hgb  8.7 L 


 


Hct  27.3 L 


 


MCV  70.9 L 


 


MCH  22.7 L 


 


MCHC  31.9 L 


 


RDW  16.6 H 


 


Plt Count  347 


 


MPV  8.2 


 


Neut % (Auto)  70.6 


 


Lymph % (Auto)  18.8 L 


 


Mono % (Auto)  6.7 


 


Eos % (Auto)  3.5 


 


Baso % (Auto)  0.4 


 


Neut # (Auto)  4.9 


 


Lymph # (Auto)  1.3 


 


Mono # (Auto)  0.5 


 


Eos # (Auto)  0.2 


 


Baso # (Auto)  0.0 


 


Sodium   142


 


Potassium   3.6


 


Chloride   106


 


Carbon Dioxide   28


 


Anion Gap   12


 


BUN   5 L


 


Creatinine   0.5 L


 


Est GFR ( Amer)   > 60


 


Est GFR (Non-Af Amer)   > 60


 


Random Glucose   90


 


Calcium   8.2 L


 


Total Bilirubin   1.1


 


AST   51 H D


 


ALT   176 H


 


Alkaline Phosphatase   179 H


 


Total Protein   5.6 L


 


Albumin   2.7 L


 


Globulin   2.9


 


Albumin/Globulin Ratio   0.9 L


 


Amylase  


 


Lipase   443 H














Critical Care Progress Note





- Nutrition


Nutrition: 


 Nutrition











 Category Date Time Status


 


 Liquid Diet [DIET] Diets  03/02/18 Dinner Active














Assessment/Plan





- Assessment and Plan (Free Text)


Assessment: 





A/P





Acute pancreatitis s/p ERCP, h/o pelvic mass s/p LSO





- Continue meds


- GI follow up


- GYN-ONC follow up

## 2018-03-05 NOTE — PCM.SURG1
Surgeon's Initial Post Op Note





- Surgeon's Notes


Surgeon: Dr. Victor


Assistant: Dr. Soriano PGY2, Dr. Anne PGY2


Type of Anesthesia: General Endo


Pre-Operative Diagnosis: Gallstone Pancreatitis


Operative Findings: See Operative Dictation


Post-Operative Diagnosis: Gallstone Pancreatitis


Operation Performed: Laparoscopic Cholecystectomy, with intraoperative 

cholangiogram converted to an open cholecystectomy


Specimen/Specimens Removed: Gallbladder


Estimated Blood Loss: EBL {In ML}: 100


Blood Products Given: N/A


Drains Used: Dl


Post-Op Condition: Fair


Date of Surgery/Procedure: 03/05/18


Time of Surgery/Procedure: 15:00

## 2018-03-05 NOTE — RAD
PROCEDURE:  Intraoperative cholangiogram



HISTORY:

CHOLANGIOGRAM



COMPARISON:

None



TECHNIQUE:

Standard protocol for this study/examination.



FINDINGS:

 Total fluoroscopic time (continuous mode) utilized during the 

procedure: 111.6 seconds. Total exam DLP: (mGy) 43.18



IMPRESSION:

Submitted images from the current procedure: 6.0.

## 2018-03-06 NOTE — CP.PCM.PCO
<Jaydon Hardin - Last Filed: 03/06/18 17:19>





Addendum


Addendum: 





03/06/18 17:19


Patient seen at bedside.  Patient given packet of studies and labs done for 

dysgerminoma diagnosis.  Patient counseled about appointment made for her at 

[facility] in [place], [place] for 03/09/2018 @ 10:45.  Patient informed 

that information has been faxed to Gyn-onc, Dr. Sunshine's office.  Discussed w/ 

patient importance of follow up and answered all questions asked.








<Bentley Molina - Last Filed: 03/06/18 23:26>





Physician Communication Note





- Physician Communication Note


Physician Communication Note: In addition, patient informed that [facility] 

for [facility] is needed. 





Addendum


Addendum: 


Patient informed to that [facility] at [facility] is needed and to take all 

documents needed for [facility] to her appointment on Friday. Importance of 

follow up reviewed with patient in detail and all questions answered. Patient 

given information on date and time of appointment and address of the Gyn Onc 

facility. Patient also informed to take the slides of the pelvic washings with 

her to the appointment for review by Dr. Sunshnie. All questions answered today.





03/06/18 23:23

## 2018-03-06 NOTE — OP
PROCEDURE DATE: 03/05/18



PREOPERATIVE DIAGNOSES:  Common duct stone, choledocholithiasis,

pancreatitis, and cholecystitis.



POSTOPERATIVE DIAGNOSES:  Common duct stone, choledocholithiasis,

pancreatitis, and cholecystitis.



SURGEON:  Rome Victor MD



ASSISTANTS: Dr. Soriano PGY2, Dr. Anne PGY2.



PROCEDURE: Laparoscopic Cholecystectomy, with intraoperative cholangiogram 
converted to an open cholecystectomy.



ESTIMATED BLOOD LOSS:  100 mL.



DESCRIPTION OF PROCEDURE:  In the operating room, the patient was

identified by name, name of procedure, laterality, and my curtis.  The

abdomen was entered through a prepped field after the time-out was

complete.  Veress needle was inserted.  Because of her obesity, it was

difficult to stick, but eventually a good plain was identified.  4 liters

insufflated to pressure about 10 and the Visiport was placed at the limits

of its length.  This is not a problem, the visualization of the gallbladder

was excellent.  The xiphoid 5 and two lateral 5s were placed.  The fundus

was pulled up taking the peritoneum and the omentum off of it very nicely. 

The end of the gallbladder was rapidly identified.  At this point, it became

very difficult to suction, the cystic duct was identified very quickly, and

it was massive consistent with 2 cm common duct stone that probably had

passed.  There was lot of inflammation around it.  The difficulty came by

the artery where the artery was the size of my pinky clearly abnormal for a 
cystic

artery.  This was dissected going up in the middle directly over the

artery, a small eversion was seen and we have to separate this larger

probably right hepatic from the dissection.  Posterior dissection was

achieved.  It was difficult to see the liver behind, but eventually this

was also achieved.  The anatomy was unclear, so at that point of election, a

cut was made on the cystic duct right by the gallbladder and cholangiogram

obtained.  The cholangiogram showed good dye going to the distal common,

but I was unable to identify the common hepatic ducts, although there was a

possibility I had _____ was difficult to read.  It seemed likely there was

a continuation between the cystic duct that was clearly identified, but

also it seemed to connect up to the common hepatic artery, because I was

unsure of the anatomy, this was converted to an open operation.  A _____

incision was made once the field was reset and the abdomen entered nicely,

there was nothing else untoward.  Physical exam in the pelvis showed the

uterus, but no masses that I could feel.  The gallbladder was decompressed.

Using the Bookwalter to good affect, a clamp was placed on the fundus of

the gallbladder and this was  by cautery dissection from the

liver.  In the mid portion, an arterial structure was identified,

cauterized and .  This allowed us to clearly see the anatomy.  The

cystic artery was identified well away from the presumptive right hepatic. 

It was doubly clipped and divided giving a single pedicle structure in the

cut cystic duct.  Clamp was placed below, it was strictly tied with Vicryl

and the structure removed, there was nothing untoward.  There was no

bleeding or bile.



A Dl was placed through the 5 mm port.  The umbilicus was closed _____

with a Vicryl and  stitch.  Incision was closed with a running #1 PDS

_____ tied in the middle.  The anterior surface was closed with Prolene. 

Wounds were injected with Marcaine.  Subcutaneous tissue was closed with

Vicryl and staples.  The patient was taken to recovery room in good

condition.





__________________________________________

Rome Victor MD





DD:  03/06/2018 8:49:44

DT:  03/06/2018 11:19:12

Job # [id number]



JUANITA

## 2018-03-07 NOTE — CP.[email or web address]
Provider





- Provider


Date of Admission: 


03/01/18 12:07





Attending physician: 


Virgen Lerner MD





Time Spent in preparation of Discharge (in minutes): 45





Diagnosis





- Discharge Diagnosis


(1) Gallstone pancreatitis


Status: Resolved   


Comment: MRCP confirmed  gall stone obstruction causing acute pancreatitis. Pt 

had ERCP with removal of stone. Pancreatitis was treated with IV hydration to 

resolution.   





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


Status: Acute   


Comment: Dysgerminoma of ovary based on surgical pathology from 09/21/17. Pt 

was informed of Dysgerminoma diagnosis and has appt scheduled with GYN 

Oncologist at [facility].   





(3) Anemia


Status: Chronic   


Comment: Asymptomatic. Pt has chronic hx of iron deficiency anemia based on 

prior labs. (microcytic) Discharged on ferrous sulfate. Hg 8.7 on d/c with no 

anemia symptoms.   





(4) Cholelithiasis


Status: Resolved   


Comment: Elective open cholecystectomy, closed with staples, for recurrent 

biliary pancreatitis. Will need to follow up with Surgery outpatient.   





Hospital Course





- Lab Results


Lab Results: 


 Micro Results





03/01/18 11:00   Blood-Venous   Blood Culture - Final


                            NO GROWTH AFTER 5 DAYS


03/01/18 11:00   Blood-Venous   Gram Stain - Final


                            TEST NOT PERFORMED


03/04/18 14:30   Nose   MRSA Culture (Admit) - Final


                            MRSA NOT DETECTED


03/02/18 06:15   Naris   MRSA Culture (Admit) - Final


                            MRSA NOT DETECTED


03/01/18 10:35   Urine,Clean Catch   Urine Culture - Final


                            No Growth (<1,000 CFU/ML)





 Most Recent Lab Values











WBC  9.6 K/uL (4.8-10.8)   03/07/18  05:20    


 


RBC  3.79 Mil/uL (3.80-5.20)  L  03/07/18  05:20    


 


Hgb  8.7 g/dL (12.0-16.0)  L  03/07/18  05:20    


 


Hct  26.9 % (34.0-47.0)  L  03/07/18  05:20    


 


MCV  70.8 fl (81.0-99.0)  L  03/07/18  05:20    


 


MCH  23.0 pg (27.0-31.0)  L  03/07/18  05:20    


 


MCHC  32.5 g/dL (33.0-37.0)  L  03/07/18  05:20    


 


RDW  16.9 % (11.5-14.5)  H  03/07/18  05:20    


 


Plt Count  405 K/uL (130-400)  H  03/07/18  05:20    


 


MPV  8.5 fl (7.2-11.7)   03/06/18  06:35    


 


Neut % (Auto)  74.4 % (50.0-75.0)   03/06/18  06:35    


 


Lymph % (Auto)  15.7 % (20.0-40.0)  L  03/06/18  06:35    


 


Mono % (Auto)  8.2 % (0.0-10.0)   03/06/18  06:35    


 


Eos % (Auto)  1.3 % (0.0-4.0)   03/06/18  06:35    


 


Baso % (Auto)  0.4 % (0.0-2.0)   03/06/18  06:35    


 


Neut # (Auto)  6.7 K/uL (1.8-7.0)   03/06/18  06:35    


 


Lymph # (Auto)  1.4 K/uL (1.0-4.3)   03/06/18  06:35    


 


Mono # (Auto)  0.7 K/uL (0.0-0.8)   03/06/18  06:35    


 


Eos # (Auto)  0.1 K/uL (0.0-0.7)   03/06/18  06:35    


 


Baso # (Auto)  0.0 K/uL (0.0-0.2)   03/06/18  06:35    


 


Neutrophils % (Manual)  92 % (42-75)  H  03/01/18  10:10    


 


Band Neutrophils %  1 % (0-2)   03/01/18  10:10    


 


Lymphocytes % (Manual)  6 % (20-50)  L  03/01/18  10:10    


 


Monocytes % (Manual)  1 % (0-10)   03/01/18  10:10    


 


Toxic Granulation  Present   03/01/18  10:10    


 


Platelet Estimate  Slightly increased  (NORMAL)  H  03/01/18  10:10    


 


Large Platelets  Present   03/01/18  10:10    


 


Hypochromasia (manual)  Slight   03/01/18  10:10    


 


Anisocytosis (manual)  Slight   03/01/18  10:10    


 


Microcytosis (manual)  Slight   03/01/18  10:10    


 


PT  12.7 Seconds (9.8-13.1)   03/05/18  05:55    


 


INR  1.1  (0.9-1.2)   03/05/18  05:55    


 


APTT  40.9 Seconds (25.6-37.1)  H  03/05/18  05:55    


 


Sodium  140 mmol/l (132-148)   03/07/18  05:20    


 


Potassium  4.1 MMOL/L (3.6-5.0)   03/07/18  05:20    


 


Chloride  97 mmol/L ()  L  03/07/18  05:20    


 


Carbon Dioxide  30 mmol/L (22-30)   03/07/18  05:20    


 


Anion Gap  17  (10-20)   03/07/18  05:20    


 


BUN  7 mg/dl (7-17)   03/07/18  05:20    


 


Creatinine  0.6 mg/dl (0.7-1.2)  L  03/07/18  05:20    


 


Est GFR ( Amer)  > 60   03/07/18  05:20    


 


Est GFR (Non-Af Amer)  > 60   03/07/18  05:20    


 


POC Glucose (mg/dL)  93 mg/dL ()   03/01/18  17:48    


 


Random Glucose  92 mg/dL ()   03/07/18  05:20    


 


Lactic Acid  0.8 MMOL/L (0.7-2.1)   03/02/18  04:18    


 


Calcium  8.7 mg/dL (8.4-10.2)   03/07/18  05:20    


 


Total Bilirubin  0.7 mg/dl (0.2-1.3)   03/06/18  06:35    


 


AST  33 U/L (14-36)   03/06/18  06:35    


 


ALT  91 U/L (9-52)  H  03/06/18  06:35    


 


Alkaline Phosphatase  127 U/L ()  H  03/06/18  06:35    


 


Total Protein  6.2 G/DL (6.3-8.2)  L  03/06/18  06:35    


 


Albumin  3.0 g/dL (3.5-5.0)  L  03/06/18  06:35    


 


Globulin  3.2 gm/dL (2.2-3.9)   03/06/18  06:35    


 


Albumin/Globulin Ratio  1.0  (1.0-2.1)   03/06/18  06:35    


 


Triglycerides  94 mg/DL (0-149)   03/01/18  10:10    


 


Cholesterol  169 mg/dL (0-199)   03/01/18  10:10    


 


LDL Cholesterol Direct  69 mg/dL (0-129)   03/01/18  10:10    


 


HDL Cholesterol  50 MG/DL (30-70)   03/01/18  10:10    


 


Amylase  456 U/L ()  H D 03/02/18  18:39    


 


Lipase  443 U/L ()  H  03/03/18  04:20    


 


Alpha Fetoprotein  1.0 IU/mL (0.0-7.22)   03/05/18  05:55    


 


 Antigen  10.7 U/mL (0-35)   03/03/18  09:45    


 


Urine Color  Yellow  (YELLOW)   03/01/18  19:05    


 


Urine Clarity  Slighty-cloudy  (Clear)   03/01/18  19:05    


 


Urine pH  7.0  (5.0-8.0)   03/01/18  19:05    


 


Ur Specific Gravity  1.010  (1.003-1.030)   03/01/18  19:05    


 


Urine Protein  Negative mg/dL (NEGATIVE)   03/01/18  19:05    


 


Urine Glucose (UA)  Neg mg/dL (Normal)   03/01/18  19:05    


 


Urine Ketones  Negative mg/dL (NEGATIVE)   03/01/18  19:05    


 


Urine Blood  Negative  (NEGATIVE)   03/01/18  19:05    


 


Urine Nitrate  Negative  (NEGATIVE)   03/01/18  19:05    


 


Urine Bilirubin  Negative  (NEGATIVE)   03/01/18  19:05    


 


Urine Urobilinogen  0.2-1.0 mg/dL (0.2-1.0)   03/01/18  19:05    


 


Ur Leukocyte Esterase  Neg Matt/uL (Negative)   03/01/18  19:05    


 


Urine RBC (Auto)  2 /hpf (0-3)   03/01/18  19:05    


 


Urine Microscopic WBC  < 1 /hpf (0-5)   03/01/18  19:05    


 


Ur Squamous Epith Cells  < 1 /hpf (0-5)   03/01/18  19:05    


 


Urine Bacteria  Rare  (<OCC)   03/01/18  19:05    


 


Blood Type  O POSITIVE   03/05/18  05:55    


 


Antibody Screen  Negative   03/05/18  05:55    


 


BBK History Checked  Patient has bt   03/05/18  05:55    














- Hospital Course


Hospital Course: 








Consultations: GI, General Surgery


Procedures: ERCP, Open Cholecystectomy 


Complications: none








Hospital Course:





Pt seen and examined on day of discharge. Reports pain controlled. 





Pt is a 18 y/o female who presented with acute abdominal pain and found to have 

gallstone pancreatitis. Pt received ERCP, pancreatitis resolved, and had open 

cholecystectomy (w/ staples in place). Given Dysgerminoma, arrangements made 

for f/u with Gyn Onc at [facility]. Pt stable upon d/c. 





Discharge Medications:


No changes in home meds


Pain Rx provided





Discharge Instructions: F/U with me in [facility], 3/13, 1pm. F/u with general surgery

, 3/14. F/u with GYN Onc on 03/09. Pt is aware of all appts. 














Discharge Exam





- Head Exam


Head Exam: ATRAUMATIC, NORMOCEPHALIC





Discharge Plan





- Discharge Medications


Prescriptions: 


Docusate [Colace] 100 mg PO BID #14 cap


Ferrous Sulfate [Feosol] 325 mg PO BID #60 tab


Ibuprofen [Motrin Tab] 800 mg PO Q6 #30 tab


oxyCODONE/Acetaminophen [Percocet 5/325 mg Tab] 1 ea PO Q6 PRN #12 tab


 PRN Reason: Pain, Severe (8-10)





- Follow Up Plan


Condition: FAIR


Disposition: HOME/ ROUTINE


Instructions:  Cholecystectomy, Open Surgery


Additional Instructions: 


follow up with PMD at [facility]





follow up with GYN Oncology on 03/09/2018 at [facility]





follow up General Surgery Clinic within 7-10days 


Referrals: 


Rome Victor MD [Staff Provider] - 03/15/18


[facility] [Outside] - 03/13/18 1:00 pm

## 2022-08-19 NOTE — CP.PCM.PN
Addendum entered and electronically signed by Lorena Teresa MD  03/03/18 17:21: 





Today at 4:30pm, I had a lengthy discussion with the patient with regards to 

her Dysgerminoma diagnosis. Pt states that she was told there was an abnormal 

lab finding over the phone but was unable to make an appointment due to 

insurance barriers and difficulty finding a care-taker for her children.  I 

informed her of the results of her ovarian tissue biopsy from the [facility] completed 

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 Pt verbalized understanding. I discussed 

with the patient the likely need for further imaging studies to evaluate for 

metastasis but given the enlarged lymph node noted on a recent ultrasound, it 

is a possibility that it may have metastasized.  I strongly recommended to the 

patient that she follow up with a Gyn oncology at [facility] as 

soon as possible for further evaluation and management. Family later joined the 

patient at the bedside and the diagnosis was further explained with the patient'

s consent. All patient's and family's questions were answered. Pt was visibly 

upset and tearful but consolable. 





All communication was conducted in Irish via an In demand  Service 

with : [id number]





Original Note:








Subjective





- Date & Time of Evaluation


Date of Evaluation: 03/03/18


Time of Evaluation: 08:00





- Subjective


Subjective: 


 No acute overnight events. Pt seen and evaluated in the am. Pt reports that 

abdominal pain is improved from yesterday. Tolerating liquid diet. Denies n/v/d

, fever, chills, cp, cough. Ambulating. Urination normal. 








Objective





- Vital Signs/Intake and Output


Vital Signs (last 24 hours): 


 











Temp Pulse Resp BP Pulse Ox


 


 98 F   81   32 H  146/92 H  97 


 


 03/03/18 13:42  03/03/18 13:55  03/03/18 13:55  03/03/18 13:55  03/03/18 13:55








Intake and Output: 


 











 03/03/18 03/03/18





 06:59 18:59


 


Intake Total 3600 2760


 


Output Total 2400 1900


 


Balance 1200 860














- Medications


Medications: 


 Current Medications





Famotidine (Pepcid)  20 mg IVP DAILY [facility]


   Last Admin: 03/03/18 08:31 Dose:  20 mg


Heparin Sodium (Porcine) (Heparin)  5,000 units SC Q12 [facility]


   PRN Reason: Protocol


   Last Admin: 03/03/18 08:23 Dose:  5,000 units


Lactated Ringer's (Lactated Ringer's)  1,000 mls @ 50 mls/hr IV .Q20H [facility]


   Last Admin: 03/03/18 13:43 Dose:  50 mls/hr











- Labs


Labs: 


 





 03/03/18 04:20 





 03/03/18 04:20 





 











PT  11.6 Seconds (9.8-13.1)   03/01/18  10:10    


 


INR  1.0  (0.9-1.2)   03/01/18  10:10    


 


APTT  34.0 Seconds (25.6-37.1)   03/01/18  10:10    














- Constitutional


Appears: Well, Non-toxic, No Acute Distress





- ENT Exam


ENT Exam: Mucous Membranes Moist





- Respiratory Exam


Respiratory Exam: Clear to Ausculation Bilateral, NORMAL BREATHING PATTERN.  

absent: Rales, Wheezes





- Cardiovascular Exam


Cardiovascular Exam: REGULAR RHYTHM, +S1, +S2.  absent: Murmur





- GI/Abdominal Exam


GI & Abdominal Exam: Soft.  absent: Distended, Tenderness





- Extremities Exam


Extremities Exam: Normal Inspection.  absent: Calf Tenderness, Pedal Edema





- Neurological Exam


Neurological Exam: Alert, Awake, Oriented x3





- Psychiatric Exam


Psychiatric exam: Normal Affect





Assessment and Plan





- Assessment and Plan (Free Text)


Assessment: 





18 y/o female with hx of acalculus cholecystitis admitted for gallstone 

pancreatitis s/p ERCP stone removal. Plan for Lap Cholecystectomy on Monday.





#Pancreatitis, resolved


-Abdominal pain no longer present. Tolerating fluids.


-Afebrile 


-Leukocytosis resolved. 


-LFT's, Lipase trending down


-Abx d/c'd





#Choledocolithiasis 


-CBD 1.7cm filling defect seen as per MRCP


-ERCP completed yesterday by GI


-General surgery and GI on board


-Plan for Lap Choley on Monday





#Dysgerminoma


-Pt had laparoscopic salphino-oophorectomy on 9/17. Pathology report resulted 

in + for Dysgerminoma. Pt is unware as she was not responsive to phonecalls 

made.


-Will alert pt of this finding and strongly recommend f/u with GYN oncology 

outpatient. 


-Will order BhCG and AFP for monday am labs





#Diet


-Liquid


-Advance to Regular in the evening 





#DVT ppx


Heparin SQ


SCD





Med Surg
Subjective





- Date & Time of Evaluation


Date of Evaluation: 03/02/18


Time of Evaluation: 08:00





- Subjective


Subjective: 








No acute overnight events. Pt seen and evaluated at the bedside in the am. 

Reports improvement of abdominal pain, fully relieved with medication. Mild 

nausea, no vomiting. Regular BM and urination. Denies fever, chills, cp, sob. 








Objective





- Vital Signs/Intake and Output


Vital Signs (last 24 hours): 


 











Temp Pulse Resp BP Pulse Ox


 


 98.6 F   105 H  18   126/77   95 


 


 03/02/18 11:35  03/02/18 11:35  03/02/18 11:35  03/02/18 11:35  03/02/18 11:35








Intake and Output: 


 











 03/02/18 03/02/18





 06:59 18:59


 


Intake Total 4100 1000


 


Output Total 1400 


 


Balance 2700 1000














- Medications


Medications: 


 Current Medications





Hydromorphone HCl (Dilaudid)  1 mg IVP Q6 PRN


   PRN Reason: Pain, severe (8-10)


   Last Admin: 03/01/18 21:38 Dose:  1 mg


Hydromorphone HCl (Dilaudid)  0.5 mg IVP Q4 PRN


   PRN Reason: Pain, moderate (4-7)


   Stop: 03/03/18 13:23


   Last Admin: 03/02/18 08:06 Dose:  0.5 mg


Piperacillin Sod/Tazobactam (Sod 3.375 gm/ Sodium Chloride)  100 mls @ 100 mls/

hr IVPB Q12 [facility]


   PRN Reason: Protocol


   Last Admin: 03/02/18 08:15 Dose:  100 mls/hr


Lactated Ringer's (Lactated Ringer's)  1,000 mls @ 500 mls/hr IV .Q2H [facility]


   Last Admin: 03/02/18 08:09 Dose:  500 mls/hr


Ondansetron HCl (Zofran Inj)  4 mg IVP Q6 PRN


   PRN Reason: Nausea/Vomiting











- Labs


Labs: 


 





 03/02/18 04:18 





 03/02/18 04:18 





 











PT  11.6 Seconds (9.8-13.1)   03/01/18  10:10    


 


INR  1.0  (0.9-1.2)   03/01/18  10:10    


 


APTT  34.0 Seconds (25.6-37.1)   03/01/18  10:10    














- Constitutional


Appears: Well, Non-toxic, No Acute Distress





- Eye Exam


Eye Exam: absent: Scleral icterus





- ENT Exam


ENT Exam: Mucous Membranes Moist





- Respiratory Exam


Respiratory Exam: Clear to Ausculation Bilateral.  absent: Rales, Wheezes





- Cardiovascular Exam


Cardiovascular Exam: REGULAR RHYTHM, +S1, +S2.  absent: Murmur





- GI/Abdominal Exam


GI & Abdominal Exam: Soft, Tenderness (Mild epigastric tenderness), Normal 

Bowel Sounds





- Extremities Exam


Extremities Exam: absent: Calf Tenderness, Pedal Edema





- Neurological Exam


Neurological Exam: Alert, Awake, Oriented x3





- Psychiatric Exam


Psychiatric exam: Normal Mood





Assessment and Plan





- Assessment and Plan (Free Text)


Assessment: 








20 y/o female with hx of acalculus cholecystitis admitted for gallstone 

pancreatitis s/p ERCP. 





#Pancreatitis, improving


-Pain, controlled


-Afebrile 


-WBC trending down, 9.0 today


-LFT's, Lipase trending down


-C/W aggressive IV hydration, LR at 500cc/hr


-C/W Abx, Zosyn q12


-PRN Dilaudid 1mg IVP q6 for pain


-PRN Zofran for [place]





#Choledocolithiasis 


-CBD 1.7cm filling defect seen as per MRCP


-ERCP completed today by GI


-Monitor for post ERCP pancreatitis; repeat labs in the evening


-General surgery and GI on board





#Dysgerminoma


-Pt had laparoscopic salphino-oophorectomy on 9/17. Pathology report resulted 

in + for Dysgerminoma. Pt is unware as she was not responsive to phonecalls 

made.


-Will alert pt of this finding and strongly recommend f/u with GYN oncology 

outpatient. 





#Diet


-NPO





#DVT ppx


Heparin SQ


SCD
Subjective





- Date & Time of Evaluation


Date of Evaluation: 03/02/18


Time of Evaluation: 09:45





- Subjective


Subjective: 





General surgery progress note: Dr. Victor





19 year old female patient was seen and evaluated at bedside this morning. 

Patient reports that she has little pain but is managing it well with the 

medications. Patient is AAOx3 and is in NAD. No other complains this morning





Objective





- Vital Signs/Intake and Output


Vital Signs (last 24 hours): 


 











Temp Pulse Resp BP Pulse Ox


 


 99.3 F   86   14   141/59 L  100 


 


 03/02/18 10:08  03/02/18 10:08  03/02/18 10:08  03/02/18 10:08  03/02/18 10:08








Intake and Output: 


 











 03/02/18 03/02/18





 06:59 18:59


 


Intake Total 4100 0


 


Output Total 1400 


 


Balance 2700 0














- Medications


Medications: 


 Current Medications





Hydromorphone HCl (Dilaudid)  1 mg IVP Q6 PRN


   PRN Reason: Pain, severe (8-10)


   Last Admin: 03/01/18 21:38 Dose:  1 mg


Hydromorphone HCl (Dilaudid)  0.5 mg IVP Q4 PRN


   PRN Reason: Pain, moderate (4-7)


   Stop: 03/03/18 13:23


   Last Admin: 03/02/18 08:06 Dose:  0.5 mg


Piperacillin Sod/Tazobactam (Sod 3.375 gm/ Sodium Chloride)  100 mls @ 100 mls/

hr IVPB Q12 [facility]


   PRN Reason: Protocol


   Last Admin: 03/02/18 08:15 Dose:  100 mls/hr


Lactated Ringer's (Lactated Ringer's)  1,000 mls @ 500 mls/hr IV .Q2H [facility]


   Last Admin: 03/02/18 08:09 Dose:  500 mls/hr


Ondansetron HCl (Zofran Inj)  4 mg IVP Q6 PRN


   PRN Reason: Nausea/Vomiting











- Labs


Labs: 


 





 03/02/18 04:18 





 03/02/18 04:18 





 











PT  11.6 Seconds (9.8-13.1)   03/01/18  10:10    


 


INR  1.0  (0.9-1.2)   03/01/18  10:10    


 


APTT  34.0 Seconds (25.6-37.1)   03/01/18  10:10    














- Constitutional


Appears: Well, Non-toxic, No Acute Distress





- Head Exam


Head Exam: ATRAUMATIC





- Eye Exam


Eye Exam: Scleral icterus





- Neurological Exam


Neurological Exam: Alert, Awake, Oriented x3





- Psychiatric Exam


Psychiatric exam: Normal Affect, Normal Mood





- Skin


Additional comments: 





yellowing of the skin noted





Assessment and Plan





- Assessment and Plan (Free Text)


Assessment: 





19 year old female patient with abdominal pain 2/2 Acute pancreatitis


Plan: 





- Rec. ERCP


- Cont. abx 


- IVF


- Will need lap cholecystectomy after ERCP
Subjective





- Date & Time of Evaluation


Date of Evaluation: 03/03/18


Time of Evaluation: 05:19





- Subjective


Subjective: 





SURGERY PROGRESS NOTE FOR DR. STRINGER





19F seen and examined at bedside. No acute events overnight. Patient states 

abdominal pain much improved. Denies N/V/F/C. 





Objective





- Vital Signs/Intake and Output


Vital Signs (last 24 hours): 


 











Temp Pulse Resp BP Pulse Ox


 


 98.1 F   65   17   99/69 L  98 


 


 03/03/18 00:00  03/03/18 04:00  03/03/18 04:00  03/03/18 04:00  03/03/18 04:00








Intake and Output: 


 











 03/02/18 03/03/18





 18:59 06:59


 


Intake Total 5320 3000


 


Output Total 2360 1775


 


Balance 2960 1225














- Medications


Medications: 


 Current Medications





Heparin Sodium (Porcine) (Heparin)  5,000 units SC Q12 [facility]


   PRN Reason: Protocol


   Last Admin: 03/02/18 21:25 Dose:  5,000 units


Hydromorphone HCl (Dilaudid)  1 mg IVP Q6 PRN


   PRN Reason: Pain, severe (8-10)


   Last Admin: 03/01/18 21:38 Dose:  1 mg


Hydromorphone HCl (Dilaudid)  0.5 mg IVP Q4 PRN


   PRN Reason: Pain, moderate (4-7)


   Stop: 03/03/18 13:23


   Last Admin: 03/03/18 00:33 Dose:  0.5 mg


Piperacillin Sod/Tazobactam (Sod 3.375 gm/ Sodium Chloride)  100 mls @ 100 mls/

hr IVPB Q12 [facility]


   PRN Reason: Protocol


   Last Admin: 03/02/18 21:25 Dose:  100 mls/hr


Lactated Ringer's (Lactated Ringer's)  1,000 mls @ 300 mls/hr IV .Q3H20M [facility]


   Last Admin: 03/02/18 23:38 Dose:  300 mls/hr


Ondansetron HCl (Zofran Inj)  4 mg IVP Q6 PRN


   PRN Reason: Nausea/Vomiting











- Labs


Labs: 


 





 03/03/18 04:20 





 03/03/18 04:20 





 











PT  11.6 Seconds (9.8-13.1)   03/01/18  10:10    


 


INR  1.0  (0.9-1.2)   03/01/18  10:10    


 


APTT  34.0 Seconds (25.6-37.1)   03/01/18  10:10    














- Constitutional


Appears: Non-toxic, No Acute Distress





- Head Exam


Head Exam: ATRAUMATIC





- Eye Exam


Eye Exam: EOMI, PERRL, Scleral icterus (Improving)





- Respiratory Exam


Respiratory Exam: Clear to Ausculation Bilateral, NORMAL BREATHING PATTERN





- Cardiovascular Exam


Cardiovascular Exam: REGULAR RHYTHM, +S1, +S2





- GI/Abdominal Exam


GI & Abdominal Exam: Soft, Tenderness (mildly tender on left upper quadrant).  

absent: Distended, Firm, Guarding, Rigid, Rebound





- Neurological Exam


Neurological Exam: Alert, Awake, Oriented x3





- Skin


Skin Exam: Dry, Intact, Warm


Additional comments: 





jaundice improving





Assessment and Plan





- Assessment and Plan (Free Text)


Assessment: 





19F with Gallstone pancreatitis and ascending cholangitis, improving


s/p ERCP POD#1 : removal of one 1.5cm choledocholithiasis


Charcot triad: RUQ pain, Jaundice, Fever


Elevated lipase, trending down


Plan: 





- Patient will need Laparoscopic cholecystectomy


- Pending Gyn Onc eval for Dysgerminoma (pathology result from [facility] performed 

late last year)


- Monitor CBC/CMP


- Monitor vitals 


- Pepcid - Patient has hx of gastritis.





Further recs discuss with Dr. Valente Anne, PGY2
Subjective





- Date & Time of Evaluation


Date of Evaluation: 03/04/18


Time of Evaluation: 07:00





- Subjective


Subjective: 


Patient seen and examined at bedside with attending





19F with an episode of nausea overnight that has since resolved and patient 

denies any abdominal pain at present.  Otherwise, she denies any SOB, chest pain

, nausea and reports being hungry.





Objective





- Vital Signs/Intake and Output


Vital Signs (last 24 hours): 


 











Temp Pulse Resp BP Pulse Ox


 


 36.7 C   88   16   134/79   97 


 


 03/04/18 04:00  03/04/18 07:28  03/04/18 07:28  03/04/18 07:28  03/04/18 07:28








Intake and Output: 


 











 03/04/18 03/04/18





 06:59 18:59


 


Intake Total 1100 


 


Output Total 1550 


 


Balance -450 














- Medications


Medications: 


 Current Medications





Docusate Sodium (Colace)  100 mg PO BID [facility]


Famotidine (Pepcid)  20 mg IVP DAILY [facility]


   Last Admin: 03/03/18 08:31 Dose:  20 mg


Heparin Sodium (Porcine) (Heparin)  5,000 units SC Q12 [facility]


   PRN Reason: Protocol


   Last Admin: 03/03/18 20:16 Dose:  5,000 units


Hydromorphone HCl (Dilaudid)  0.5 mg IVP Q4 PRN


   PRN Reason: Pain, moderate (4-7)


   Last Admin: 03/03/18 18:54 Dose:  0.5 mg











- Labs


Labs: 


 





 03/03/18 04:20 





 03/03/18 04:20 





 











PT  11.6 Seconds (9.8-13.1)   03/01/18  10:10    


 


INR  1.0  (0.9-1.2)   03/01/18  10:10    


 


APTT  34.0 Seconds (25.6-37.1)   03/01/18  10:10    














- Constitutional


Appears: Well, Non-toxic





- ENT Exam


ENT Exam: Mucous Membranes Moist





- Respiratory Exam


Respiratory Exam: Clear to Ausculation Bilateral, NORMAL BREATHING PATTERN





- Cardiovascular Exam


Cardiovascular Exam: REGULAR RHYTHM, +S1, +S2





- GI/Abdominal Exam


GI & Abdominal Exam: Soft, Normal Bowel Sounds





- Extremities Exam


Extremities Exam: Full ROM, Normal Capillary Refill, Normal Inspection





- Neurological Exam


Neurological Exam: Alert, Awake, Oriented x3





- Psychiatric Exam


Psychiatric exam: Normal Affect, Normal Mood





- Skin


Skin Exam: Dry, Warm





Assessment and Plan


(1) Pancreatitis, acute


Assessment & Plan: 


Acute biliary pancreatitis resolved at this time and plan for cholecystectomy 

Monday as per surgery.


- NPO/IVF after midnight


- Full Liquid diet


- OOB ad javier


- Incentive spirometry


- CBC/CMP/Coags/T&S


Status: Acute   





(2) DVT prophylaxis


Assessment & Plan: 


SC Heparin to be held and SCDs placed


Status: Acute   





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


Assessment & Plan: 


Information and plan discussed with patient and family last night.


- CA-125 WNL


- AFP: pending


- hCG will be completed post-op


- CT chest?


Status: Acute
Subjective





- Date & Time of Evaluation


Date of Evaluation: 03/04/18


Time of Evaluation: 09:00





- Subjective


Subjective: 





Surgery: Dr. Victor





Pt seen and examined. Sitting up in bedside chair. No acute events overnight. 

States she feels well and pain is mostly resolved. Was tolerating clears but 

had some nausea with solid food. Denies F/C. 





Objective





- Vital Signs/Intake and Output


Vital Signs (last 24 hours): 


 











Temp Pulse Resp BP Pulse Ox


 


 98.4 F   92 H  16   119/78   97 


 


 03/04/18 16:36  03/04/18 16:36  03/04/18 16:36  03/04/18 16:36  03/04/18 16:36








Intake and Output: 


 











 03/04/18 03/04/18





 06:59 18:59


 


Intake Total 1100 730


 


Output Total 1550 


 


Balance -450 730














- Medications


Medications: 


 Current Medications





Docusate Sodium (Colace)  100 mg PO BID [facility]


   Last Admin: 03/04/18 17:54 Dose:  100 mg


Famotidine (Pepcid)  20 mg IVP DAILY [facility]


   Last Admin: 03/04/18 09:17 Dose:  20 mg


Heparin Sodium (Porcine) (Heparin)  5,000 units SC Q12 [facility]


   PRN Reason: Protocol


   Last Admin: 03/04/18 09:18 Dose:  5,000 units


Hydromorphone HCl (Dilaudid)  0.5 mg IVP Q4 PRN


   PRN Reason: Pain, moderate (4-7)


   Last Admin: 03/04/18 09:18 Dose:  0.5 mg


Sodium Chloride (Sodium Chloride 0.9%)  1,000 mls @ 150 mls/hr IV .Q6H40M [facility]


   Stop: 03/05/18 16:45











- Labs


Labs: 


 





 03/03/18 04:20 





 03/03/18 04:20 





 











PT  11.6 Seconds (9.8-13.1)   03/01/18  10:10    


 


INR  1.0  (0.9-1.2)   03/01/18  10:10    


 


APTT  34.0 Seconds (25.6-37.1)   03/01/18  10:10    














- Constitutional


Appears: Well, No Acute Distress





- Head Exam


Head Exam: ATRAUMATIC, NORMOCEPHALIC





- Eye Exam


Eye Exam: Normal appearance





- ENT Exam


ENT Exam: Mucous Membranes Moist





- Respiratory Exam


Respiratory Exam: NORMAL BREATHING PATTERN





- Cardiovascular Exam


Cardiovascular Exam: RRR





- GI/Abdominal Exam


GI & Abdominal Exam: Soft.  absent: Distended, Guarding, Tenderness





- Neurological Exam


Neurological Exam: Alert, Awake, Oriented x3





- Skin


Skin Exam: Dry, Warm





Assessment and Plan





- Assessment and Plan (Free Text)


Assessment: 





19F with gallstone pancreatitis s/p ERCP 


Plan: 





- plan for OR in AM 3/5


- keep NPO past midnight


- IVF, pain control


- d/w Dr. Victor who agrees with above





Chris, PGY-3
Subjective





- Date & Time of Evaluation


Date of Evaluation: 03/05/18


Time of Evaluation: 16:44





- Subjective


Subjective: 








Pt seen and examined at the bedside prior to procedure. Scheduled for noon 

today. Pt seems comfortable. No complaints. Denies pain, n/v. 








Objective





- Vital Signs/Intake and Output


Vital Signs (last 24 hours): 


 











Temp Pulse Resp BP Pulse Ox


 


 99.3 F   87   17   112/59 L  100 


 


 03/05/18 16:30  03/05/18 16:30  03/05/18 16:30  03/05/18 16:30  03/05/18 16:30








Intake and Output: 


 











 03/05/18 03/05/18





 06:59 18:59


 


Intake Total  1320


 


Balance  1320














- Medications


Medications: 


 Current Medications





Docusate Sodium (Colace)  100 mg PO BID [facility]


   Last Admin: 03/05/18 08:57 Dose:  Not Given


Famotidine (Famotidine)  20 mg IVP DAILY [facility]


   Last Admin: 03/05/18 08:56 Dose:  20 mg


Heparin Sodium (Porcine) (Heparin)  5,000 units SC Q12 [facility]


   PRN Reason: Protocol


   Last Admin: 03/04/18 21:19 Dose:  5,000 units


Hydromorphone HCl (Dilaudid)  0.5 mg IVP Q4 PRN


   PRN Reason: Pain, moderate (4-7)


   Last Admin: 03/04/18 23:48 Dose:  0.5 mg


Hydromorphone HCl (Dilaudid)  0.5 mg IVP Q10M PRN


   PRN Reason: Pain, severe (8-10)


   Stop: 03/05/18 16:50


   Last Admin: 03/05/18 15:35 Dose:  0.5 mg


Hydromorphone HCl (Dilaudid 0.2 Mg/Ml Pca)  0 mg IV PRN PRN; Protocol


   PRN Reason: Pain, severe (8-10)


   Last Admin: 03/05/18 16:00 Dose:  0 mg


Sodium Chloride (Sodium Chloride 0.9%)  1,000 mls @ 150 mls/hr IV .Q6H40M [facility]


   Stop: 03/05/18 16:45


   Last Admin: 03/05/18 14:45 Dose:  20 mls


Lactated Ringer's (Lactated Ringer's)  1,000 mls @ 150 mls/hr IV .Q6H40M [facility]


   Last Admin: 03/05/18 15:00 Dose:  200 mls


Metoclopramide HCl (Reglan)  10 mg IVP ONCE PRN


   PRN Reason: Nausea/Vomiting


   Stop: 03/05/18 16:50


Naloxone HCl (Narcan)  0.1 mg IVP Q2M PRN


   PRN Reason: Shortness of Breath


Ondansetron HCl (Zofran Inj)  4 mg IVP Q8 PRN


   PRN Reason: Nausea/Vomiting











- Labs


Labs: 


 





 03/05/18 05:55 





 03/05/18 05:55 





 











PT  12.7 Seconds (9.8-13.1)   03/05/18  05:55    


 


INR  1.1  (0.9-1.2)   03/05/18  05:55    


 


APTT  40.9 Seconds (25.6-37.1)  H  03/05/18  05:55    














- Constitutional


Appears: Well, Non-toxic, No Acute Distress





- ENT Exam


ENT Exam: Mucous Membranes Moist





- Respiratory Exam


Respiratory Exam: Clear to Ausculation Bilateral.  absent: Rales, Wheezes





- Cardiovascular Exam


Cardiovascular Exam: REGULAR RHYTHM, +S1, +S2.  absent: Murmur





- GI/Abdominal Exam


GI & Abdominal Exam: Soft.  absent: Distended, Tenderness


Additional comments: 





Obese abdomen





- Extremities Exam


Extremities Exam: Normal Inspection





- Neurological Exam


Neurological Exam: Alert, Awake, Oriented x3





- Psychiatric Exam


Psychiatric exam: Normal Affect





Assessment and Plan





- Assessment and Plan (Free Text)


Assessment: 





18 y/o female with hx of acalculus cholecystitis admitted for gallstone 

pancreatitis s/p ERCP stone removal. Laprascopic Cholecystectomy, with 

intraoperative cholangiagram converted to an open cholecystectomy completed 

today. Will reassess pt post operatively 





#Pancreatitis, resolved


-Abdominal pain no longer present. Tolerating fluids.


-Afebrile 


-Leukocytosis resolved. 


-LFT's, Lipase trending down


-Abx d/c'd





#Choledocolithiasis s/p Cholecystectomy. 


-CBD 1.7cm filling defect seen as per MRCP


-ERCP completed  by GI


-General surgery and GI on board


-Open Cholecystectomy completed today, pt stable. 





#Dysgerminoma


-Pt had laparoscopic salphino-oophorectomy on 9/17. Pathology report resulted 

in + for Dysgerminoma. Pt informed of diagnosis and plan is to facilitate an 

appt with Gyn Onc at [facility] following discharge from hospital. Pt's OBGYN.


-Will order [facility]  for tomorrow am


-AFP: 1


-Ca-125 pending





#Diet


NPO, liquids in morning





#DVT ppx


Heparin SQ


SCD





Med Surg
Subjective





- Date & Time of Evaluation


Date of Evaluation: 03/05/18


Time of Evaluation: 16:44





- Subjective


Subjective: 





s/p OR





Objective





- Vital Signs/Intake and Output


Vital Signs (last 24 hours): 


 











Temp Pulse Resp BP Pulse Ox


 


 99.3 F   87   17   112/59 L  100 


 


 03/05/18 16:30  03/05/18 16:30  03/05/18 16:30  03/05/18 16:30  03/05/18 16:30








Intake and Output: 


 











 03/05/18 03/05/18





 06:59 18:59


 


Intake Total  1320


 


Balance  1320














- Medications


Medications: 


 Current Medications





Docusate Sodium (Colace)  100 mg PO BID [facility]


   Last Admin: 03/05/18 08:57 Dose:  Not Given


Famotidine (Famotidine)  20 mg IVP DAILY [facility]


   Last Admin: 03/05/18 08:56 Dose:  20 mg


Heparin Sodium (Porcine) (Heparin)  5,000 units SC Q12 FAUZIA


   PRN Reason: Protocol


   Last Admin: 03/04/18 21:19 Dose:  5,000 units


Hydromorphone HCl (Dilaudid)  0.5 mg IVP Q4 PRN


   PRN Reason: Pain, moderate (4-7)


   Last Admin: 03/04/18 23:48 Dose:  0.5 mg


Hydromorphone HCl (Dilaudid)  0.5 mg IVP Q10M PRN


   PRN Reason: Pain, severe (8-10)


   Stop: 03/05/18 16:50


   Last Admin: 03/05/18 15:35 Dose:  0.5 mg


Hydromorphone HCl (Dilaudid 0.2 Mg/Ml Pca)  0 mg IV PRN PRN; Protocol


   PRN Reason: Pain, severe (8-10)


   Last Admin: 03/05/18 16:00 Dose:  0 mg


Sodium Chloride (Sodium Chloride 0.9%)  1,000 mls @ 150 mls/hr IV .Q6H40M [facility]


   Stop: 03/05/18 16:45


   Last Admin: 03/05/18 14:45 Dose:  20 mls


Lactated Ringer's (Lactated Ringer's)  1,000 mls @ 150 mls/hr IV .Q6H40M [facility]


   Last Admin: 03/05/18 15:00 Dose:  200 mls


Metoclopramide HCl (Reglan)  10 mg IVP ONCE PRN


   PRN Reason: Nausea/Vomiting


   Stop: 03/05/18 16:50


Naloxone HCl (Narcan)  0.1 mg IVP Q2M PRN


   PRN Reason: Shortness of Breath


Ondansetron HCl (Zofran Inj)  4 mg IVP Q8 PRN


   PRN Reason: Nausea/Vomiting











- Labs


Labs: 


 





 03/05/18 05:55 





 03/05/18 05:55 





 











PT  12.7 Seconds (9.8-13.1)   03/05/18  05:55    


 


INR  1.1  (0.9-1.2)   03/05/18  05:55    


 


APTT  40.9 Seconds (25.6-37.1)  H  03/05/18  05:55    














- Head Exam


Head Exam: NORMOCEPHALIC





- Neck Exam


Neck Exam: Normal Inspection





- Cardiovascular Exam


Cardiovascular Exam: REGULAR RHYTHM





- GI/Abdominal Exam


GI & Abdominal Exam: Soft, Tenderness, Normal Bowel Sounds





Assessment and Plan





- Assessment and Plan (Free Text)


Assessment: 





18 yo female with gallstone pancreatitis





s/p ercp


s/p cholecystectomy


dc planning when able
Subjective





- Date & Time of Evaluation


Date of Evaluation: 03/06/18


Time of Evaluation: 04:45





- Subjective


Subjective: 





Patient seen at bedside.  Patient given packet of studies and labs done for 

dysgerminoma diagnosis.  Patient counseled about appointment made for her for 03 /09/2018 @ 10:45.  Discussed w/ patient 





Objective





- Vital Signs/Intake and Output


Vital Signs (last 24 hours): 


 











Temp Pulse Resp BP Pulse Ox


 


 99.6 F   90   20   95/61 L  96 


 


 03/06/18 15:39  03/06/18 15:39  03/06/18 15:39  03/06/18 15:39  03/06/18 15:39











- Medications


Medications: 


 Current Medications





Acetaminophen (Tylenol 325mg Tab)  650 mg PO Q6 PRN


   PRN Reason: Pain, Mild (1-3)


Docusate Sodium (Colace)  100 mg PO BID [facility]


   Last Admin: 03/06/18 16:14 Dose:  100 mg


Famotidine (Famotidine)  20 mg IVP DAILY [facility]


   Last Admin: 03/06/18 09:03 Dose:  20 mg


Ferrous Sulfate (Feosol)  325 mg PO BID [facility]


   Last Admin: 03/06/18 09:38 Dose:  325 mg


Heparin Sodium (Porcine) (Heparin)  5,000 units SC Q12 [facility]


   PRN Reason: Protocol


   Last Admin: 03/06/18 09:07 Dose:  5,000 units


Hydromorphone HCl (Dilaudid)  1 mg IVP Q4 PRN


   PRN Reason: Pain, severe (8-10)


Naloxone HCl (Narcan)  0.1 mg IVP Q2M PRN


   PRN Reason: Shortness of Breath


Ondansetron HCl (Zofran Inj)  4 mg IVP Q8 PRN


   PRN Reason: Nausea/Vomiting


Oxycodone/Acetaminophen (Percocet 5/325 Mg Tab)  2 tab PO Q6 PRN


   PRN Reason: Pain, moderate (4-7)


   Stop: 03/09/18 12:48


   Last Admin: 03/06/18 16:14 Dose:  2 tab


Potassium Chloride (K-Dur 20 Meq Er Tab)  20 meq PO BID [facility]


   Stop: 03/07/18 04:00


   Last Admin: 03/06/18 16:14 Dose:  20 meq











- Labs


Labs: 


 





 03/06/18 06:35 





 03/06/18 06:35 





 











PT  12.7 Seconds (9.8-13.1)   03/05/18  05:55    


 


INR  1.1  (0.9-1.2)   03/05/18  05:55    


 


APTT  40.9 Seconds (25.6-37.1)  H  03/05/18  05:55
Subjective





- Date & Time of Evaluation


Date of Evaluation: 03/06/18


Time of Evaluation: 07:34





- Subjective


Subjective: 





Surgery progress note: Dr. Victor





Patient seen and examined this morning POD1. Appears to be resting comfortably 

in her bed. No acute events overnight. Patient is AAOx3 and is in NAD. Denies 

of passing gas today. Reports that the pain is well managed. Denies F/N/V/C 

today. 








Objective





- Vital Signs/Intake and Output


Vital Signs (last 24 hours): 


 











Temp Pulse Resp BP Pulse Ox


 


 99.4 F   112 H  18   119/76   98 


 


 03/06/18 03:40  03/06/18 03:40  03/06/18 03:40  03/06/18 03:40  03/06/18 03:40











- Medications


Medications: 


 Current Medications





Docusate Sodium (Colace)  100 mg PO BID [facility]


   Last Admin: 03/05/18 08:57 Dose:  Not Given


Famotidine (Famotidine)  20 mg IVP DAILY [facility]


   Last Admin: 03/05/18 08:56 Dose:  20 mg


Heparin Sodium (Porcine) (Heparin)  5,000 units SC Q12 [facility]


   PRN Reason: Protocol


   Last Admin: 03/04/18 21:19 Dose:  5,000 units


Hydromorphone HCl (Dilaudid)  0.5 mg IVP Q4 PRN


   PRN Reason: Pain, moderate (4-7)


   Last Admin: 03/06/18 03:50 Dose:  0.5 mg


Hydromorphone HCl (Dilaudid 0.2 Mg/Ml Pca)  0 mg IV PRN PRN; Protocol


   PRN Reason: Pain, severe (8-10)


   Last Admin: 03/05/18 16:00 Dose:  0 mg


Lactated Ringer's (Lactated Ringer's)  1,000 mls @ 150 mls/hr IV .Q6H40M [facility]


   Last Admin: 03/06/18 04:36 Dose:  150 mls/hr


Ketorolac Tromethamine (Toradol)  15 mg IVP Q6 PRN


   PRN Reason: Pain, moderate (4-7)


Naloxone HCl (Narcan)  0.1 mg IVP Q2M PRN


   PRN Reason: Shortness of Breath


Ondansetron HCl (Zofran Inj)  4 mg IVP Q8 PRN


   PRN Reason: Nausea/Vomiting











- Labs


Labs: 


 





 03/05/18 05:55 





 03/06/18 06:35 





 











PT  12.7 Seconds (9.8-13.1)   03/05/18  05:55    


 


INR  1.1  (0.9-1.2)   03/05/18  05:55    


 


APTT  40.9 Seconds (25.6-37.1)  H  03/05/18  05:55    














- Constitutional


Appears: Well, Non-toxic, No Acute Distress





- Head Exam


Head Exam: ATRAUMATIC





- GI/Abdominal Exam


GI & Abdominal Exam: Soft, Tenderness


Additional comments: 





Surgical dressing is clean, dry and intact


Approx. 40 cc of sero-sanguineous drainage in the KHARI drain








Assessment and Plan





- Assessment and Plan (Free Text)


Assessment: 





19F with open cholecystectomy POD1


Plan: 





- IVF, pain control


- monitor labs and vitals


- Monitor diet - advance diet as tolerated 


- incentive spirometer





- d/w Dr. Victor who agrees with above
Subjective





- Date & Time of Evaluation


Date of Evaluation: 03/06/18


Time of Evaluation: 08:00





- Subjective


Subjective: 


 Pt seen and examined this morning at the bedside. Pain controlled. Has been 

out of bed to use bathroom. Denies n/v. States she is ready for fluids. Denies 

cough, cp, sob. Using incentive spirometry








Objective





- Vital Signs/Intake and Output


Vital Signs (last 24 hours): 


 











Temp Pulse Resp BP Pulse Ox


 


 98.7 F   87   18   108/73   93 L


 


 03/06/18 07:35  03/06/18 07:35  03/06/18 07:35  03/06/18 07:35  03/06/18 07:35











- Medications


Medications: 


 Current Medications





Docusate Sodium (Colace)  100 mg PO BID [facility]


   Last Admin: 03/05/18 08:57 Dose:  Not Given


Famotidine (Famotidine)  20 mg IVP DAILY [facility]


   Last Admin: 03/05/18 08:56 Dose:  20 mg


Heparin Sodium (Porcine) (Heparin)  5,000 units SC Q12 [facility]


   PRN Reason: Protocol


   Last Admin: 03/04/18 21:19 Dose:  5,000 units


Hydromorphone HCl (Dilaudid)  0.5 mg IVP Q4 PRN


   PRN Reason: Pain, moderate (4-7)


   Last Admin: 03/06/18 03:50 Dose:  0.5 mg


Hydromorphone HCl (Dilaudid 0.2 Mg/Ml Pca)  0 mg IV PRN PRN; Protocol


   PRN Reason: Pain, severe (8-10)


   Last Admin: 03/05/18 16:00 Dose:  0 mg


Ketorolac Tromethamine (Toradol)  15 mg IVP Q6 PRN


   PRN Reason: Pain, moderate (4-7)


Naloxone HCl (Narcan)  0.1 mg IVP Q2M PRN


   PRN Reason: Shortness of Breath


Ondansetron HCl (Zofran Inj)  4 mg IVP Q8 PRN


   PRN Reason: Nausea/Vomiting











- Labs


Labs: 


 





 03/06/18 06:35 





 03/06/18 06:35 





 











PT  12.7 Seconds (9.8-13.1)   03/05/18  05:55    


 


INR  1.1  (0.9-1.2)   03/05/18  05:55    


 


APTT  40.9 Seconds (25.6-37.1)  H  03/05/18  05:55    














- Constitutional


Appears: Well, Non-toxic, No Acute Distress





- ENT Exam


ENT Exam: Mucous Membranes Moist





- Respiratory Exam


Respiratory Exam: Clear to Ausculation Bilateral.  absent: Rales





- Cardiovascular Exam


Cardiovascular Exam: REGULAR RHYTHM.  absent: +S1, +S2





- GI/Abdominal Exam


GI & Abdominal Exam: Soft, Tenderness (Appropriate near incision site), Normal 

Bowel Sounds.  absent: Distended, Guarding, Rigid





- Extremities Exam


Extremities Exam: Normal Inspection.  absent: Pedal Edema





- Neurological Exam


Neurological Exam: Alert, Awake, Normal Gait





- Psychiatric Exam


Psychiatric exam: Normal Affect





- Skin


Skin Exam: Normal Color





Assessment and Plan





- Assessment and Plan (Free Text)


Assessment: 


 





18 y/o female admitted for gallstone pancreatitis  s/p open cholecystectomy POD 

#1. 





#Choledocolithiasis s/p open Cholecystectomy. Doing well post op day 1. Stable, 

Pain controlled


-Dl Drain- minimal sanguineous drainage


-General surgery following


-Diet will be advanced today as tolerated


-Mild pain Tylenol 650 q6 prn


-Moderate pain Percocet 5-325mg po 2 tabs prn


-Severe pain Dilaudid 1mg IV 





#Pancreatitis, resolved


-Abdominal pain no longer present. Tolerating fluids.


-Afebrile 


-Leukocytosis resolved. 


-LFT's, Lipase trending down


-Abx d/c'd





#Dysgerminoma


-Pt had laparoscopic salphino-oophorectomy on 9/17. Pathology report resulted 

in + for Dysgerminoma. Pt informed of diagnosis and plan is to facilitate an 

appt with Gyn Onc at [facility] following discharge from hospital. 


-BhCG pending


-AFP: 1


-Ca-125 pending








#Diet


Advanced to full liquid today





#DVT ppx


Heparin SQ


Ambulating 





Med Surg
Subjective





- Date & Time of Evaluation


Date of Evaluation: 03/07/18


Time of Evaluation: 07:57





- Subjective


Subjective: 


General Surgery Progress Note for Dr. Victor





This 19F was seen and examined this AM at bedside. No acute events overnight. 

Pain well controlled with percocet. She is tolerating diet. She is ambulating. 

Dl drain had 40cc serosang output. Dressings removed wound well approximated 

non erthematous non draining. She denies any fevers chills chest pain nausea 

vomiting or diarrhea. 





Objective





- Vital Signs/Intake and Output


Vital Signs (last 24 hours): 


 











Temp Pulse Resp BP Pulse Ox


 


 99.5 F   95 H  20   125/83   95 


 


 03/06/18 23:27  03/06/18 23:27  03/06/18 23:27  03/06/18 23:27  03/06/18 23:27








Intake and Output: 


 











 03/07/18 03/07/18





 06:59 18:59


 


Intake Total 480 


 


Output Total 40 


 


Balance 440 














- Medications


Medications: 


 Current Medications





Acetaminophen (Tylenol 325mg Tab)  650 mg PO Q6 PRN


   PRN Reason: Pain, Mild (1-3)


Docusate Sodium (Colace)  100 mg PO BID [facility]


   Last Admin: 03/06/18 16:14 Dose:  100 mg


Famotidine (Famotidine)  20 mg IVP DAILY [facility]


   Last Admin: 03/06/18 09:03 Dose:  20 mg


Ferrous Sulfate (Feosol)  325 mg PO BID [facility]


   Last Admin: 03/06/18 19:52 Dose:  325 mg


Heparin Sodium (Porcine) (Heparin)  7,500 units SC Q8 [facility]


   PRN Reason: Protocol


Hydromorphone HCl (Dilaudid)  1 mg IVP Q4 PRN


   PRN Reason: Pain, severe (8-10)


   Last Admin: 03/07/18 05:54 Dose:  1 mg


Naloxone HCl (Narcan)  0.1 mg IVP Q2M PRN


   PRN Reason: Shortness of Breath


Ondansetron HCl (Zofran Inj)  4 mg IVP Q8 PRN


   PRN Reason: Nausea/Vomiting


Oxycodone/Acetaminophen (Percocet 5/325 Mg Tab)  2 tab PO Q6 PRN


   PRN Reason: Pain, moderate (4-7)


   Stop: 03/09/18 12:48


   Last Admin: 03/06/18 22:51 Dose:  2 tab











- Labs


Labs: 


 





 03/07/18 05:20 





 03/07/18 05:20 





 











PT  12.7 Seconds (9.8-13.1)   03/05/18  05:55    


 


INR  1.1  (0.9-1.2)   03/05/18  05:55    


 


APTT  40.9 Seconds (25.6-37.1)  H  03/05/18  05:55    














- Constitutional


Appears: Non-toxic, No Acute Distress





- Head Exam


Head Exam: ATRAUMATIC, NORMOCEPHALIC





- Eye Exam


Eye Exam: EOMI, Normal appearance





- ENT Exam


ENT Exam: Mucous Membranes Moist





- Respiratory Exam


Respiratory Exam: NORMAL BREATHING PATTERN





- Cardiovascular Exam


Cardiovascular Exam: REGULAR RHYTHM





- GI/Abdominal Exam


GI & Abdominal Exam: Soft





- Neurological Exam


Neurological Exam: Alert, Awake





- Skin


Skin Exam: Dry, Intact





Assessment and Plan





- Assessment and Plan (Free Text)


Assessment: 


19F POD#2 s/p Lap apurva with IOC converted to open and doing well





- Vital signs stables


- Labs at her baseline order PRN as necessary


- Drain output 40cc serosang


- Continue incentive spirometry


- Continue to ambulate


- Tolerating hepatic diet 


- Continue hep sub q for dvt ppx


- Continue pain control


- Followup with Gyn-Onc


- D/W Dr. Valente Soriano PGY2


[phone]
None